# Patient Record
Sex: MALE | Race: WHITE | Employment: FULL TIME | URBAN - METROPOLITAN AREA
[De-identification: names, ages, dates, MRNs, and addresses within clinical notes are randomized per-mention and may not be internally consistent; named-entity substitution may affect disease eponyms.]

---

## 2017-06-27 ENCOUNTER — HOSPITAL ENCOUNTER (OUTPATIENT)
Dept: PHYSICAL THERAPY | Age: 47
Discharge: HOME | End: 2017-06-27
Payer: COMMERCIAL

## 2017-06-27 PROCEDURE — 97110 THERAPEUTIC EXERCISES: CPT | Performed by: PHYSICAL THERAPIST

## 2017-06-27 PROCEDURE — 97162 PT EVAL MOD COMPLEX 30 MIN: CPT | Performed by: PHYSICAL THERAPIST

## 2017-06-27 NOTE — PROGRESS NOTES
[address] [place] [place] [address], [address]  Phone: [phone]  Fax: [phone]    Plan of Care/ Statement of Necessity for Physical Therapy Services 2-15    Patient name: Orlando Mcneill. : 1970  Provider#: [id number]  Referral source: Padma Ridley MD      Medical/Treatment Diagnosis: Bilateral shoulder pain [M25.511, M25.512]     Prior Hospitalization: see medical history     Comorbidities: None  Prior Level of Function: see initial eval  Medications: Verified on Patient Summary List    Start of Care: 17      Onset Date: 2017       The Plan of Care and following information is based on the information from the initial evaluation. Assessment/ key information: Patient presents with B subacromial impingement with L > R. Today he presented with significantly impaired ROM (abduction = 90 degrees), impaired RTC strength, and postural dysfunction. Evaluation Complexity History LOW Complexity : Zero comorbidities / personal factors that will impact the outcome / POC; Examination MEDIUM Complexity : 3 Standardized tests and measures addressing body structure, function, activity limitation and / or participation in recreation  ;Presentation MEDIUM Complexity : Evolving with changing characteristics  ; Clinical Decision Making MEDIUM Complexity : FOTO score of 26-74  Overall Complexity Rating: MEDIUM    Problem List: pain affecting function, decrease ROM, decrease strength, decrease ADL/ functional abilitiies, decrease activity tolerance, decrease flexibility/ joint mobility and decrease transfer abilities   Treatment Plan may include any combination of the following: Therapeutic exercise, Therapeutic activities, Neuromuscular re-education, Physical agent/modality, Manual therapy, Patient education, Self Care training and Functional mobility training  Patient / Family readiness to learn indicated by: asking questions, trying to perform skills and interest  Persons(s) to be included in education: patient (P)  Barriers to Learning/Limitations: None  Patient Goal (s): I want to be able to reach overhead without so much pain.   Patient Self Reported Health Status: excellent  Rehabilitation Potential: excellent    Short Term Goals: To be accomplished in 6 treatments:  1. Patient will be able to reach overhead through a full ROM with <2/10 pain. 2. Patient will be able to lift 10# from the floor with <2/10 pain. 3. Patient will be able to achieve FIR >T10 on the left to allow for improved ability to put on a jacket. Long Term Goals: To be accomplished in 12 treatments:  1. Patient will be able to sleep through the night for one week with no pain or limitation. 2. Patient will be able to lift 20# from the floor with no pain or limitation. 3. Patient will be able to lift 5# over his head with no pain or limitation. Frequency / Duration: Patient to be seen 2 times per week for 8 weeks. Patient/ Caregiver education and instruction: self care, activity modification and exercises    [x]  Plan of care has been reviewed with ENRIQUE Aleman, PT , DPT, OCS, Cert. DN   6/27/2017 8:59 AM    ________________________________________________________________________    I certify that the above Therapy Services are being furnished while the patient is under my care. I agree with the treatment plan and certify that this therapy is necessary.     [de-identified] Signature:____________________  Date:____________Time: _________

## 2017-06-27 NOTE — PROGRESS NOTES
PT INITIAL EVALUATION NOTE 2-15    Patient Name: Chelsie Torres. Date:2017  : 1970  [x]  Patient  Verified  Payor: BLUE "[employer] / Plan: [place] 5747 PPO / Product Type: PPO /    In time:8:30 AM  Out time:9:30 AM  Total Treatment Time (min): 60  Visit #: 1     Treatment Area: Bilateral shoulder pain [M25.511, M25.512]    SUBJECTIVE  Pain Level (0-10 scale): 4  Any medication changes, allergies to medications, adverse drug reactions, diagnosis change, or new procedure performed?: [] No    [x] Yes (see summary sheet for update)  Subjective:     Chronic B shoulder pain, L>R  PLOF: No limitations with reaching overhead for work  Mechanism of Injury: Insidious starting in January, initially the right was worse than left. He put off getting treatment due to a death in the family, but saw an orthopedist in April. He performed an injection to both shoulders, which helped significantly on the right, but not as much the left. Now he has pain only with abduction (impingement sign) and was referred to PT. No imaging has been done, but an MRI will be performed if PT is not successful. Previous Treatment/Compliance: He has been treated at this clinic before for TKR x 2 with great success. PMHx/Surgical Hx: B TKR  Work Hx:  at [employer] Situation: Lives at home with family  Pt Goals: to reduce pain and improve mobility at work  Barriers: chronicity  Motivation: very motivated  Substance use: none   FABQ Score: low  Cognition: A & O x 3        OBJECTIVE/EXAMINATION  Posture: Forward head and rounded shoulders in sitting    Palpation: TTP along the lateral acromion  Joint Mobility: NT  Scapulohumoral Control / Rhythm:   Able to eccentrically lower with good control?  Left: [x] Yes  [] No         Right: [x] Yes  [] No      Shoulder AROM, PROM:         R   L  Shoulder Flexion  170   160       Shoulder Abduction  100   90  Shoulder ER   80   75  Shoulder IR   50   45  Shoulder FIR   T8   L1             MMT:   Shoulder flexion  4/5   4/5  Shoulder ER   4/5   4/5  Shoulder IR   5/5   5/5    Neurological: Sensations: Intact    Special Tests:   Painful Arc: Positive   Steele: Positive   Neer's: Positive   Empty/Full Can Test: Positive   Drop Arm: Negative   ER Lag:Negative   Lift Off test: Negative   AC Shear: Negative         Modality rationale: decrease pain and increase tissue extensibility to improve the patients ability to lift overhead   Min Type Additional Details    [] Estim: []Att   []Unatt        []TENS instruct                  []IFC  []Premod   []NMES                     []Other:  []w/US   []w/ice   []w/heat  Position:  Location:    []  Traction: [] Cervical       []Lumbar                       [] Prone          []Supine                       []Intermittent   []Continuous Lbs:  [] before manual  [] after manual  []w/heat    []  Ultrasound: []Continuous   [] Pulsed at:                            []1MHz   []3MHz Location:  W/cm2:    []  Paraffin         Location:  []w/heat   10 []  Ice     [x]  Heat  []  Ice massage Position: sitting  Location: B shoulders    []  Laser  []  Other: Position:  Location:    []  Vasopneumatic Device Pressure:       [] lo [] med [] hi   Temperature:    [x] Skin assessment post-treatment:  [x]intact []redness- no adverse reaction    []redness  adverse reaction:     40 min Therapeutic Exercise:  [x] See flow sheet :   Rationale: increase ROM, increase strength and improve coordination to improve the patients ability to lift overhead without pain          With   [] TE   [] TA   [] neuro   [] other: Patient Education: [x] Review HEP    [] Progressed/Changed HEP based on:   [] positioning   [] body mechanics   [] transfers   [] heat/ice application    [] other:        Other Objective/Functional Measures:    Pain Level (0-10 scale) post treatment: 3    ASSESSMENT:      [x]  See Plan of Care      Brooks Romeo PT , DPT, OCS, Cert.  DN   6/27/2017  9:00 AM

## 2017-06-29 ENCOUNTER — APPOINTMENT (OUTPATIENT)
Dept: PHYSICAL THERAPY | Age: 47
End: 2017-06-29
Payer: COMMERCIAL

## 2017-07-06 ENCOUNTER — HOSPITAL ENCOUNTER (OUTPATIENT)
Dept: PHYSICAL THERAPY | Age: 47
Discharge: HOME | End: 2017-07-06
Payer: COMMERCIAL

## 2017-07-06 PROCEDURE — 97110 THERAPEUTIC EXERCISES: CPT

## 2017-07-06 PROCEDURE — 97140 MANUAL THERAPY 1/> REGIONS: CPT

## 2017-07-10 ENCOUNTER — HOSPITAL ENCOUNTER (OUTPATIENT)
Dept: PHYSICAL THERAPY | Age: 47
Discharge: HOME | End: 2017-07-10
Payer: COMMERCIAL

## 2017-07-10 PROCEDURE — 97110 THERAPEUTIC EXERCISES: CPT | Performed by: PHYSICAL MEDICINE & REHABILITATION

## 2017-07-10 NOTE — PROGRESS NOTES
PT DAILY TREATMENT NOTE 2-15    Patient Name: Luli Gonzalez. Date:7/10/2017  : 1970  [x]  Patient  Verified  Payor: BLUE CROSS / Plan: [employer] [address] 5747 PPO / Product Type: PPO /    In time:510 pm  Out time: 610 pm  Total Treatment Time (min): 60  Visit #: 3    Treatment Area: Bilateral shoulder pain [M25.511, M25.512]    SUBJECTIVE  Pain Level (0-10 scale): 2-3  Any medication changes, allergies to medications, adverse drug reactions, diagnosis change, or new procedure performed?: [x] No    [] Yes (see summary sheet for update)  Subjective functional status/changes:   [] No changes reported  Patient reports he is doing well today. Pt stated the pain comes and goes and moves from shoulder to shoulder.     OBJECTIVE    Modality rationale: decrease inflammation and decrease pain to improve the patients ability to lift, reach, carry and complete ADL's   Min Type Additional Details    [] Estim: []Att   []Unatt        []TENS instruct                  []IFC  []Premod   []NMES                     []Other:  []w/US   []w/ice   []w/heat  Position:  Location:    []  Traction: [] Cervical       []Lumbar                       [] Prone          []Supine                       []Intermittent   []Continuous Lbs:  [] before manual  [] after manual  []w/heat    []  Ultrasound: []Continuous   [] Pulsed at:                            []1MHz   []3MHz Location:  W/cm2:    []  Paraffin         Location:  []w/heat   15 []  Ice     [x]  Heat  []  Ice massage Position: seated  Location:B shoulder    []  Laser  []  Other: Position:  Location:    []  Vasopneumatic Device Pressure:       [] lo [] med [] hi   Temperature:    [x] Skin assessment post-treatment:  [x]intact []redness- no adverse reaction    []redness  adverse reaction:     45 min Therapeutic Exercise:  [x] See flow sheet :   Rationale: increase ROM and increase strength to improve the patients ability to lift, reach, carry and complete ADL's       With   [x] TE   [] TA   [] neuro   [] other: Patient Education: [x] Review HEP    [] Progressed/Changed HEP based on:   [] positioning   [] body mechanics   [] transfers   [] heat/ice application    [] other:      Other Objective/Functional Measures:   Pt with only able to lift 90 degrees AROM flexion before pain increased. Pain Level (0-10 scale) post treatment: 1    ASSESSMENT/Changes in Function:     Patient will continue to benefit from skilled PT services to modify and progress therapeutic interventions, address functional mobility deficits, address ROM deficits, address strength deficits, analyze and address soft tissue restrictions, analyze and cue movement patterns, analyze and modify body mechanics/ergonomics and assess and modify postural abnormalities to attain remaining goals. []  See Plan of Care  []  See progress note/recertification  []  See Discharge Summary         Progress towards goals / Updated goals:  Patient able to tolerate all exercises with reports of increased muscle soreness. Patient requires verbal cues to ensure proper form and mechanics with exercises.     PLAN  [x]  Upgrade activities as tolerated     [x]  Continue plan of care  [x]  Update interventions per flow sheet       []  Discharge due to:_  []  Other:_      Clementina Ramires PTA, CPT 7/10/2017  4:52 PM

## 2017-07-12 ENCOUNTER — HOSPITAL ENCOUNTER (OUTPATIENT)
Dept: PHYSICAL THERAPY | Age: 47
Discharge: HOME | End: 2017-07-12
Payer: COMMERCIAL

## 2017-07-12 PROCEDURE — 97110 THERAPEUTIC EXERCISES: CPT | Performed by: PHYSICAL THERAPIST

## 2017-07-12 NOTE — PROGRESS NOTES
PT DAILY TREATMENT NOTE 2-15    Patient Name: Walker Jimenez.   Date:2017  : 1970  [x]  Patient  Verified  Payor: BLUE CROSS / Plan: [place] 5747 PPO / Product Type: PPO /    In time:350 pm  Out time: 4:50 pm  Total Treatment Time (min): 60  Visit #: 4    Treatment Area: Bilateral shoulder pain [M25.511, M25.512]    SUBJECTIVE  Pain Level (0-10 scale): 4/10 soreness  Any medication changes, allergies to medications, adverse drug reactions, diagnosis change, or new procedure performed?: [x] No    [] Yes (see summary sheet for update)  Subjective functional status/changes:   [] No changes reported  Pt reports after trying the new exercises he had an increase in pec soreness    OBJECTIVE    Modality rationale: decrease inflammation and decrease pain to improve the patients ability to lift, reach, carry and complete ADL's   Min Type Additional Details    [] Estim: []Att   []Unatt        []TENS instruct                  []IFC  []Premod   []NMES                     []Other:  []w/US   []w/ice   []w/heat  Position:  Location:    []  Traction: [] Cervical       []Lumbar                       [] Prone          []Supine                       []Intermittent   []Continuous Lbs:  [] before manual  [] after manual  []w/heat    []  Ultrasound: []Continuous   [] Pulsed at:                            []1MHz   []3MHz Location:  W/cm2:    []  Paraffin         Location:  []w/heat   15 []  Ice     [x]  Heat  []  Ice massage Position: seated  Location:B shoulder    []  Laser  []  Other: Position:  Location:    []  Vasopneumatic Device Pressure:       [] lo [] med [] hi   Temperature:    [x] Skin assessment post-treatment:  [x]intact []redness- no adverse reaction    []redness  adverse reaction:     45 min Therapeutic Exercise:  [x] See flow sheet :   Rationale: increase ROM and increase strength to improve the patients ability to lift, reach, carry and complete ADL's       With   [x] TE   [] TA   [] neuro   [] other: Patient Education: [x] Review HEP    [] Progressed/Changed HEP based on:   [] positioning   [] body mechanics   [] transfers   [x] heat/ice application    [] other:      Other Objective/Functional Measures:   No increase in pain with today's interventions    Pain Level (0-10 scale) post treatment: 0    ASSESSMENT/Changes in Function:     Patient will continue to benefit from skilled PT services to modify and progress therapeutic interventions, address functional mobility deficits, address ROM deficits, address strength deficits, analyze and address soft tissue restrictions, analyze and cue movement patterns, analyze and modify body mechanics/ergonomics and assess and modify postural abnormalities to attain remaining goals. []  See Plan of Care  []  See progress note/recertification  []  See Discharge Summary         Progress towards goals / Updated goals:  Patient able to tolerate all exercises with reports of increased muscle soreness. Patient requires verbal cues to ensure proper form and mechanics with exercises.     PLAN  [x]  Upgrade activities as tolerated     [x]  Continue plan of care  [x]  Update interventions per flow sheet       []  Discharge due to:_  []  Other:_      Ronaldo Rondon PT, DPT 7/12/2017  3:50 PM

## 2017-07-17 ENCOUNTER — HOSPITAL ENCOUNTER (OUTPATIENT)
Dept: PHYSICAL THERAPY | Age: 47
Discharge: HOME | End: 2017-07-17
Payer: COMMERCIAL

## 2017-07-17 PROCEDURE — 97110 THERAPEUTIC EXERCISES: CPT | Performed by: PHYSICAL MEDICINE & REHABILITATION

## 2017-07-17 NOTE — PROGRESS NOTES
PT DAILY TREATMENT NOTE 2-15    Patient Name: Lenny Garcia. Date:2017  : 1970  [x]  Patient  Verified  Payor: BLUE CROSS / Plan: [place] 5747 PPO / Product Type: PPO /    In time:525 pm  Out time: 640 pm  Total Treatment Time (min): 75  Visit #: 5    Treatment Area: Bilateral shoulder pain [M25.511, M25.512]    SUBJECTIVE  Pain Level (0-10 scale): 3/10  Any medication changes, allergies to medications, adverse drug reactions, diagnosis change, or new procedure performed?: [x] No    [] Yes (see summary sheet for update)  Subjective functional status/changes:   [] No changes reported  Pt reports he is doing pretty well today.     OBJECTIVE    Modality rationale: decrease inflammation and decrease pain to improve the patients ability to lift, reach, carry and complete ADL's   Min Type Additional Details    [] Estim: []Att   []Unatt        []TENS instruct                  []IFC  []Premod   []NMES                     []Other:  []w/US   []w/ice   []w/heat  Position:  Location:    []  Traction: [] Cervical       []Lumbar                       [] Prone          []Supine                       []Intermittent   []Continuous Lbs:  [] before manual  [] after manual  []w/heat    []  Ultrasound: []Continuous   [] Pulsed at:                            []1MHz   []3MHz Location:  W/cm2:    []  Paraffin         Location:  []w/heat   15 []  Ice     [x]  Heat  []  Ice massage Position: seated  Location:B shoulder    []  Laser  []  Other: Position:  Location:    []  Vasopneumatic Device Pressure:       [] lo [] med [] hi   Temperature:    [x] Skin assessment post-treatment:  [x]intact []redness- no adverse reaction    []redness  adverse reaction:     60 min Therapeutic Exercise:  [x] See flow sheet :   Rationale: increase ROM and increase strength to improve the patients ability to lift, reach, carry and complete ADL's       With   [x] TE   [] TA   [] neuro   [] other: Patient Education: [x] Review HEP [] Progressed/Changed HEP based on:   [] positioning   [] body mechanics   [] transfers   [x] heat/ice application    [] other:      Other Objective/Functional Measures:   No increase in pain with today's interventions    Pain Level (0-10 scale) post treatment: 0    ASSESSMENT/Changes in Function:     Patient will continue to benefit from skilled PT services to modify and progress therapeutic interventions, address functional mobility deficits, address ROM deficits, address strength deficits, analyze and address soft tissue restrictions, analyze and cue movement patterns, analyze and modify body mechanics/ergonomics and assess and modify postural abnormalities to attain remaining goals. []  See Plan of Care  []  See progress note/recertification  []  See Discharge Summary         Progress towards goals / Updated goals:  Patient able to tolerate all exercises with reports of increased muscle soreness. Patient requires verbal cues to ensure proper form and mechanics with exercises.     PLAN  [x]  Upgrade activities as tolerated     [x]  Continue plan of care  [x]  Update interventions per flow sheet       []  Discharge due to:_  []  Other:_      Leilani Patel PTA, CPT 7/17/2017  3:50 PM

## 2017-07-19 ENCOUNTER — HOSPITAL ENCOUNTER (OUTPATIENT)
Dept: PHYSICAL THERAPY | Age: 47
Discharge: HOME | End: 2017-07-19
Payer: COMMERCIAL

## 2017-07-19 PROCEDURE — 97110 THERAPEUTIC EXERCISES: CPT | Performed by: PHYSICAL THERAPIST

## 2017-07-19 NOTE — PROGRESS NOTES
PT DAILY TREATMENT NOTE 2-15    Patient Name: Luli Gonzalez.   Date:2017  : 1970  [x]  Patient  Verified  Payor: BLUE CROSS / Plan: [place] [address] 5747 PPO / Product Type: PPO /    In time: 4:25 PM  Out time: 5:35 PM  Total Treatment Time (min): 70  Visit #: 6    Treatment Area: Bilateral shoulder pain [M25.511, M25.512]    SUBJECTIVE  Pain Level (0-10 scale): 2-310  Any medication changes, allergies to medications, adverse drug reactions, diagnosis change, or new procedure performed?: [x] No    [] Yes (see summary sheet for update)  Subjective functional status/changes:   [] No changes reported  Pt reports no changes since last visit  \"If I do a lot of work overhead, I have more pain\"    OBJECTIVE    Modality rationale: decrease inflammation and decrease pain to improve the patients ability to lift, reach, carry and complete ADL's   Min Type Additional Details    [] Estim: []Att   []Unatt        []TENS instruct                  []IFC  []Premod   []NMES                     []Other:  []w/US   []w/ice   []w/heat  Position:  Location:    []  Traction: [] Cervical       []Lumbar                       [] Prone          []Supine                       []Intermittent   []Continuous Lbs:  [] before manual  [] after manual  []w/heat    []  Ultrasound: []Continuous   [] Pulsed at:                            []1MHz   []3MHz Location:  W/cm2:    []  Paraffin         Location:  []w/heat   15 []  Ice     [x]  Heat  []  Ice massage Position: seated  Location:B shoulder    []  Laser  []  Other: Position:  Location:    []  Vasopneumatic Device Pressure:       [] lo [] med [] hi   Temperature:    [x] Skin assessment post-treatment:  [x]intact []redness- no adverse reaction    []redness  adverse reaction:     55 min Therapeutic Exercise:  [x] See flow sheet :   Rationale: increase ROM and increase strength to improve the patients ability to lift, reach, carry and complete ADL's       With   [x] TE   [] TA [] neuro   [] other: Patient Education: [x] Review HEP    [] Progressed/Changed HEP based on:   [x] positioning   [] body mechanics   [] transfers   [x] heat/ice application    [] other:      Other Objective/Functional Measures:   No increase in pain with today's interventions    Pain Level (0-10 scale) post treatment: 0    ASSESSMENT/Changes in Function:     Patient will continue to benefit from skilled PT services to modify and progress therapeutic interventions, address functional mobility deficits, address ROM deficits, address strength deficits, analyze and address soft tissue restrictions, analyze and cue movement patterns, analyze and modify body mechanics/ergonomics and assess and modify postural abnormalities to attain remaining goals. []  See Plan of Care  []  See progress note/recertification  []  See Discharge Summary         Progress towards goals / Updated goals:  Patient continues to require verbal cues to complete exercises with correct form and postural awareness. Patient was able to advance several exercises this visit and is progressing well towards goals.     PLAN  [x]  Upgrade activities as tolerated     [x]  Continue plan of care  [x]  Update interventions per flow sheet       []  Discharge due to:_  []  Other:_      Abilio Chauhan, PT, DPT 7/19/2017  4:30 PM

## 2017-07-24 ENCOUNTER — HOSPITAL ENCOUNTER (OUTPATIENT)
Dept: PHYSICAL THERAPY | Age: 47
Discharge: HOME | End: 2017-07-24
Payer: COMMERCIAL

## 2017-07-24 PROCEDURE — 97016 VASOPNEUMATIC DEVICE THERAPY: CPT | Performed by: PHYSICAL THERAPIST

## 2017-07-24 PROCEDURE — 97110 THERAPEUTIC EXERCISES: CPT | Performed by: PHYSICAL THERAPIST

## 2017-07-24 NOTE — PROGRESS NOTES
PT DAILY TREATMENT NOTE 2-15    Patient Name: Michelle Ledesma.   Date:2017  : 1970  [x]  Patient  Verified  Payor: BLUE CROSS / Plan: [employer] [address] 5747 PPO / Product Type: PPO /    In time:5:10 pm  Out time: 6:15 pm  Total Treatment Time (min): 72  Visit #: 7    Treatment Area: Bilateral shoulder pain [M25.511, M25.512]    SUBJECTIVE  Pain Level (0-10 scale): 7  Any medication changes, allergies to medications, adverse drug reactions, diagnosis change, or new procedure performed?: [x] No    [] Yes (see summary sheet for update)  Subjective functional status/changes:   [] No changes reported  Patient reports he has been feeling terrible since Friday morning  He saw his MD who has scheduled him an MRI for this coming Friday    OBJECTIVE    Modality rationale: decrease inflammation and decrease pain to improve the patients ability to lift, reach, carry and complete ADL's   Min Type Additional Details    [] Estim: []Att   []Unatt        []TENS instruct                  []IFC  []Premod   []NMES                     []Other:  []w/US   []w/ice   []w/heat  Position:  Location:    []  Traction: [] Cervical       []Lumbar                       [] Prone          []Supine                       []Intermittent   []Continuous Lbs:  [] before manual  [] after manual  []w/heat    []  Ultrasound: []Continuous   [] Pulsed at:                            []1MHz   []3MHz Location:  W/cm2:    []  Paraffin         Location:  []w/heat   10 []  Ice     [x]  Heat  []  Ice massage Position: seated  Location:B shoulder    []  Laser  []  Other: Position:  Location:   10 [x]  Vasopneumatic Device Pressure:       [] lo [x] med [] hi   Temperature: 34   [x] Skin assessment post-treatment:  [x]intact []redness- no adverse reaction    []redness  adverse reaction:     45 min Therapeutic Exercise:  [x] See flow sheet :   Rationale: increase ROM and increase strength to improve the patients ability to lift, reach, carry and complete ADL's    With   [x] TE   [] TA   [] neuro   [] other: Patient Education: [x] Review HEP    [] Progressed/Changed HEP based on:   [] positioning   [] body mechanics   [] transfers   [x] heat/ice application    [] other:      Other Objective/Functional Measures:   Pain with resisted horizontal abduction and doorway stretch    Pain Level (0-10 scale) post treatment: 6    ASSESSMENT/Changes in Function:     Patient will continue to benefit from skilled PT services to modify and progress therapeutic interventions, address functional mobility deficits, address ROM deficits, address strength deficits, analyze and address soft tissue restrictions, analyze and cue movement patterns, analyze and modify body mechanics/ergonomics and assess and modify postural abnormalities to attain remaining goals.      []  See Plan of Care  []  See progress note/recertification  []  See Discharge Summary         Progress towards goals / Updated goals:  Patient limited by high levels of pain    PLAN  [x]  Upgrade activities as tolerated     [x]  Continue plan of care  [x]  Update interventions per flow sheet       []  Discharge due to:_  []  Other:_      Sotero Dexter PT, DPT 7/24/2017  5:10 PM

## 2017-07-26 ENCOUNTER — APPOINTMENT (OUTPATIENT)
Dept: PHYSICAL THERAPY | Age: 47
End: 2017-07-26
Payer: COMMERCIAL

## 2017-07-28 ENCOUNTER — HOSPITAL ENCOUNTER (OUTPATIENT)
Dept: MRI IMAGING | Age: 47
Discharge: HOME | End: 2017-07-28
Attending: ORTHOPAEDIC SURGERY
Payer: COMMERCIAL

## 2017-07-28 DIAGNOSIS — M19.019 AC (ACROMIOCLAVICULAR) ARTHRITIS: ICD-10-CM

## 2017-07-28 DIAGNOSIS — M75.41 IMPINGEMENT SYNDROME OF RIGHT SHOULDER: ICD-10-CM

## 2017-07-28 DIAGNOSIS — M77.8 TENDINITIS OF RIGHT SHOULDER: ICD-10-CM

## 2017-07-28 PROCEDURE — 73221 MRI JOINT UPR EXTREM W/O DYE: CPT

## 2017-08-28 ENCOUNTER — HOSPITAL ENCOUNTER (OUTPATIENT)
Dept: MRI IMAGING | Age: 47
Discharge: HOME | End: 2017-08-28
Attending: ORTHOPAEDIC SURGERY
Payer: COMMERCIAL

## 2017-08-28 DIAGNOSIS — M25.512 PAIN IN SHOULDER REGION, LEFT: ICD-10-CM

## 2017-08-28 PROCEDURE — 73221 MRI JOINT UPR EXTREM W/O DYE: CPT

## 2017-09-14 NOTE — PROGRESS NOTES
[address] [facility], [address] [place] [address] Drive  Phone: [phone]  Fax: [phone]    Discharge Summary  2-15    Patient name: Nicolasa Ormond. : 1970  Provider#: [id number]  Referral source: Lissett Hills MD      Medical/Treatment Diagnosis: Bilateral shoulder pain [M25.511, M25.512]     Prior Hospitalization: see medical history     Comorbidities: See Plan of Care  Prior Level of Function:See Plan of Care  Medications: Verified on Patient Summary List    Start of Care: 17      Onset Date:2017   Visits from Start of Care: 7     Missed Visits: 0  Reporting Period : 17 to 17      ASSESSMENT/SUMMARY OF CARE: Mr. Niels Bates did not show good progress with PT, although he did tolerate each session well and demonstrated strong motivation to get better. After 4 weeks of PT he returned to MD for further testing due to a lack of significant progress towards goals. Short Term Goals: To be accomplished in 6 treatments:  1. Patient will be able to reach overhead through a full ROM with <2/10 pain. Not met. 2. Patient will be able to lift 10# from the floor with <2/10 pain. Not met. 3. Patient will be able to achieve FIR >T10 on the left to allow for improved ability to put on a jacket. Not met. Long Term Goals: To be accomplished in 12 treatments:  1. Patient will be able to sleep through the night for one week with no pain or limitation. Not met. 2. Patient will be able to lift 20# from the floor with no pain or limitation. Not met. 3. Patient will be able to lift 5# over his head with no pain or limitation. Not met. RECOMMENDATIONS:  [x]Discontinue therapy: []Patient has reached or is progressing toward set goals      []Patient is non-compliant or has abdicated      [x]Due to lack of appreciable progress towards set goals      [x]Other: Patient returned to MD for further testing    Olamide Handing, PT , DPT, OCS, Cert.  DN 9/14/2017 4:03 PM

## 2018-02-12 ENCOUNTER — HOSPITAL ENCOUNTER (OUTPATIENT)
Dept: PHYSICAL THERAPY | Age: 48
Discharge: HOME | End: 2018-02-12
Payer: COMMERCIAL

## 2018-02-12 PROCEDURE — 97162 PT EVAL MOD COMPLEX 30 MIN: CPT | Performed by: PHYSICAL THERAPIST

## 2018-02-12 PROCEDURE — 97016 VASOPNEUMATIC DEVICE THERAPY: CPT | Performed by: PHYSICAL THERAPIST

## 2018-02-12 PROCEDURE — 97110 THERAPEUTIC EXERCISES: CPT | Performed by: PHYSICAL THERAPIST

## 2018-02-12 NOTE — PROGRESS NOTES
[address] [facility], [address] [place] [address] Drive  Phone: [phone]  Fax: [phone]    Plan of Care/ Statement of Necessity for Physical Therapy Services 2-15    Patient name: Ching Teixeira. : 1970  Provider#: [id number]  Referral source: Félix Pritchard MD      Medical/Treatment Diagnosis: Left shoulder pain [M25.512]     Prior Hospitalization: see medical history     Comorbidities: Chronic right shoulder pain  Prior Level of Function: see initial eval  Medications: Verified on Patient Summary List    Start of Care: 18      Onset Date: 18       The Plan of Care and following information is based on the information from the initial evaluation. Assessment/ key information: Patient is three weeks post-op L rotator cuff repair with biceps tenodesis and is progressing well along rehab protocol. PROM:   Flexion: 80 degrees   Abduction: 70   ER: 30    Evaluation Complexity History MEDIUM  Complexity : 1-2 comorbidities / personal factors will impact the outcome/ POC ; Examination MEDIUM Complexity : 3 Standardized tests and measures addressing body structure, function, activity limitation and / or participation in recreation  ;Presentation MEDIUM Complexity : Evolving with changing characteristics  ; Clinical Decision Making MEDIUM Complexity : FOTO score of 26-74  Overall Complexity Rating: MEDIUM    Problem List: pain affecting function, decrease ROM, decrease strength, edema affecting function, decrease ADL/ functional abilitiies, decrease activity tolerance, decrease flexibility/ joint mobility and decrease transfer abilities   Treatment Plan may include any combination of the following: Therapeutic exercise, Therapeutic activities, Neuromuscular re-education, Physical agent/modality, Manual therapy, Patient education, Self Care training, Functional mobility training, Home safety training and Stair training  Patient / Family readiness to learn indicated by: asking questions, trying to perform skills and interest  Persons(s) to be included in education: patient (P)  Barriers to Learning/Limitations: None  Patient Goal (s): I want to be able to get back to work ASAP.   Patient Self Reported Health Status: excellent  Rehabilitation Potential: excellent    Short Term Goals: To be accomplished in 8 treatments:  1. Patient will be able to demonstrate full PROM flexion to allow for improved ability to reach overhead. 2. Patient will be able to demonstrate >45 degrees of ER PROM to allow for improved ability to put on a jacket. 3. Patient will be able to sleep through the night without being woken up by shoulder pain. Long Term Goals: To be accomplished in 16 treatments:  1. Patient will be able to demonstrate >90 degrees of AROM flexion with <2/10 shoulder pain. 2. Patient will be able to put on a jacket with <2/10 shoulder pain. 3. Patient will be able to carry 20# at his left side for 50 ft. With no pain or limitation. Frequency / Duration: Patient to be seen 2 times per week for 8 weeks. Patient/ Caregiver education and instruction: self care, activity modification and exercises    [x]  Plan of care has been reviewed with ENRIQUE Ayala, PT , DPT, OCS, Cert. DN   2/12/2018 3:07 PM    ________________________________________________________________________    I certify that the above Therapy Services are being furnished while the patient is under my care. I agree with the treatment plan and certify that this therapy is necessary.     [de-identified] Signature:____________________  Date:____________Time: _________

## 2018-02-12 NOTE — PROGRESS NOTES
PT INITIAL EVALUATION NOTE 2-15    Patient Name: Ching Teixeira. Date:2018  : 1970  [x]  Patient  Verified  Payor: BLUE CROSS / Plan: [place] [id number] PPO / Product Type: PPO /    In time:12:30 PM Out time:1:30 PM  Total Treatment Time (min): 60  Visit #: 1     Treatment Area: Left shoulder pain [M25.512]    SUBJECTIVE  Pain Level (0-10 scale): 4  Any medication changes, allergies to medications, adverse drug reactions, diagnosis change, or new procedure performed?: [] No    [x] Yes (see summary sheet for update)  Subjective:     L RTC repair with biceps tenodesis on 18  PLOF: Limited with overhead reaching, work tasks, sleeping due to shoulder pain  Mechanism of Injury: Patient had his shoulder surgery on 18 and has had no complications since then.   Previous Treatment/Compliance: He was seen previously at this clinic in 2017 to try and resolve the shoulder pain, but was unable to and opted for surgery  PMHx/Surgical Hx: B TKR, R RTC tear  Work Hx: [employer] , out of work until April  Living Situation: lives at home with family  Pt Goals: to return to work without pain  Barriers: Chronicity, PMH  Motivation: very motivated  Substance use: none   FABQ Score: elevated  Cognition: A & O x 3        OBJECTIVE/EXAMINATION   Joint Mobility:NT      Shoulder PROM:         R   L  Shoulder Flexion     80        Shoulder Abduction     70  Shoulder Extension     NT  Shoulder ER      30  Shoulder IR      To abdomen            MMT:NT         Modality rationale: decrease pain and increase tissue extensibility to improve the patients ability to reach overhead   Min Type Additional Details    [] Estim: []Att   []Unatt        []TENS instruct                  []IFC  []Premod   []NMES                     []Other:  []w/US   []w/ice   []w/heat  Position:  Location:    []  Traction: [] Cervical       []Lumbar                       [] Prone          []Supine []Intermittent   []Continuous Lbs:  [] before manual  [] after manual  []w/heat    []  Ultrasound: []Continuous   [] Pulsed at:                            []1MHz   []3MHz Location:  W/cm2:    []  Paraffin         Location:  []w/heat   10 []  Ice     [x]  Heat  []  Ice massage Position: seated  Location: left shoulder    []  Laser  []  Other: Position:  Location:   15 [x]  Vasopneumatic Device Pressure:       [x] lo [] med [] hi   Temperature:34    [x] Skin assessment post-treatment:  [x]intact []redness- no adverse reaction    []redness  adverse reaction:     25 min Therapeutic Exercise:  [x] See flow sheet :   Rationale: increase ROM, increase strength and improve coordination to improve the patients ability to reach overhead without pain          With   [] TE   [] TA   [] neuro   [] other: Patient Education: [x] Review HEP    [] Progressed/Changed HEP based on:   [] positioning   [] body mechanics   [] transfers   [] heat/ice application    [] other:        Other Objective/Functional Measures:    Pain Level (0-10 scale) post treatment: 0      ASSESSMENT:      [x]  See Plan of Care      Brent Bennett PT , DPT, OCS, Cert.  DN   2/12/2018  3:05 PM

## 2018-02-15 ENCOUNTER — HOSPITAL ENCOUNTER (OUTPATIENT)
Dept: PHYSICAL THERAPY | Age: 48
Discharge: HOME | End: 2018-02-15
Payer: COMMERCIAL

## 2018-02-15 PROCEDURE — 97110 THERAPEUTIC EXERCISES: CPT | Performed by: PHYSICAL MEDICINE & REHABILITATION

## 2018-02-15 PROCEDURE — 97016 VASOPNEUMATIC DEVICE THERAPY: CPT | Performed by: PHYSICAL MEDICINE & REHABILITATION

## 2018-02-15 NOTE — PROGRESS NOTES
PT DAILY TREATMENT NOTE - [facility] 2-15    Patient Name: Robby Gaucher. Date:2/15/2018  : 1970  [x]  Patient  Verified  Payor: BLUE CROSS / Plan: [place] [id number] PPO / Product Type: PPO /    In time:200pm  Out time:250pm  Total Treatment Time (min): 50  Total Timed Codes (min): 35  1:1 Treatment Time ( only):    Visit #: 2     Treatment Area: Left shoulder pain [M25.512]    SUBJECTIVE  Pain Level (0-10 scale): 4/10  Any medication changes, allergies to medications, adverse drug reactions, diagnosis change, or new procedure performed?: [x] No    [] Yes (see summary sheet for update)  Subjective functional status/changes:   [] No changes reported  Patient reports his exercises are difficult at home and needs to apply heat to his shoulder to help relieve the pain.      OBJECTIVE    Modality rationale: decrease pain and increase tissue extensibility to improve the patients ability to perform ADLs and lifting ability   Min Type Additional Details    [] Estim: []Att   []Unatt        []TENS instruct                  []IFC  []Premod   []NMES                     []Other:  []w/US   []w/ice   []w/heat  Position:  Location:    []  Traction: [] Cervical       []Lumbar                       [] Prone          []Supine                       []Intermittent   []Continuous Lbs:  [] before manual  [] after manual  []w/heat    []  Ultrasound: []Continuous   [] Pulsed at:                           []1MHz   []3MHz Location:  W/cm2:    [] Paraffin         Location:   []w/heat    []  Ice     []  Heat  []  Ice massage Position:  Location:    []  Laser  []  Other: Position:  Location:      []  Vasopneumatic Device Pressure:       [] lo [] med [] hi   Temperature:      [x] Skin assessment post-treatment:  [x]intact []redness- no adverse reaction    []redness  adverse reaction:     35 min Therapeutic Exercise:  [x] See flow sheet :   Rationale: increase ROM and increase strength to improve the patients ability to perform ADLs and lifting ability    Other Objective/Functional Measures: Patient experienced slight discomfort while performing AAROM & PROM     Pain Level (0-10 scale) post treatment: sore    ASSESSMENT/Changes in Function:     Patient will continue to benefit from skilled PT services to modify and progress therapeutic interventions, address functional mobility deficits, address ROM deficits, address strength deficits, analyze and address soft tissue restrictions and analyze and cue movement patterns to attain remaining goals.      []  See Plan of Care  []  See progress note/recertification  []  See Discharge Summary         Progress towards goals / Updated goals:  Patient is progressing well towards goals, will need to improve scapular stabilization in order to progress to more advanced interventions     PLAN  [x]  Upgrade activities as tolerated     [x]  Continue plan of care  [x]  Update interventions per flow sheet       []  Discharge due to:_  []  Other:_      Lola Escalona 2/15/2018  2:02 PM  2TE 1VC

## 2018-02-19 ENCOUNTER — HOSPITAL ENCOUNTER (OUTPATIENT)
Dept: PHYSICAL THERAPY | Age: 48
Discharge: HOME | End: 2018-02-19
Payer: COMMERCIAL

## 2018-02-19 PROCEDURE — 97110 THERAPEUTIC EXERCISES: CPT

## 2018-02-19 PROCEDURE — 97016 VASOPNEUMATIC DEVICE THERAPY: CPT

## 2018-02-19 NOTE — PROGRESS NOTES
PT DAILY TREATMENT NOTE - [facility] 2-15    Patient Name: Radha Tellez. Date:2018  : 1970  [x]  Patient  Verified  Payor: BLUE CROSS / Plan: [place] [id number] PPO / Product Type: PPO /    In time:2:20p  Out time:3:15p  Total Treatment Time (min): 55  Total Timed Codes (min): 40  1:1 Treatment Time ( only):    Visit #: 3     Treatment Area: Left shoulder pain [M25.512]    SUBJECTIVE  Pain Level (0-10 scale): 4/10  Any medication changes, allergies to medications, adverse drug reactions, diagnosis change, or new procedure performed?: [x] No    [] Yes (see summary sheet for update)  Subjective functional status/changes:   [] No changes reported  Patient reports he was sore after last visit, but the exercises are going well.     OBJECTIVE    Modality rationale: decrease pain and increase tissue extensibility to improve the patients ability to perform ADLs and lifting ability   Min Type Additional Details    [] Estim: []Att   []Unatt        []TENS instruct                  []IFC  []Premod   []NMES                     []Other:  []w/US   []w/ice   []w/heat  Position:  Location:    []  Traction: [] Cervical       []Lumbar                       [] Prone          []Supine                       []Intermittent   []Continuous Lbs:  [] before manual  [] after manual  []w/heat    []  Ultrasound: []Continuous   [] Pulsed at:                           []1MHz   []3MHz Location:  W/cm2:    [] Paraffin         Location:   []w/heat    []  Ice     []  Heat  []  Ice massage Position:  Location:    []  Laser  []  Other: Position:  Location:   15   [x]  Vasopneumatic Device Pressure:       [] lo [x] med [] hi   Temperature:      [x] Skin assessment post-treatment:  [x]intact []redness- no adverse reaction    []redness  adverse reaction:     40 min Therapeutic Exercise:  [x] See flow sheet :   Rationale: increase ROM and increase strength to improve the patients ability to perform ADLs and lifting ability    Other Objective/Functional Measures:      Pain Level (0-10 scale) post treatment:  \"sore\"    ASSESSMENT/Changes in Function:     Patient will continue to benefit from skilled PT services to modify and progress therapeutic interventions, address functional mobility deficits, address ROM deficits, address strength deficits, analyze and address soft tissue restrictions and analyze and cue movement patterns to attain remaining goals. []  See Plan of Care  []  See progress note/recertification  []  See Discharge Summary         Progress towards goals / Updated goals:   Patient demonstrates good tolerance for therapeutic exercises with focus on ROM. Patient will do well with continued ROM interventions within protocol.     PLAN  [x]  Upgrade activities as tolerated     [x]  Continue plan of care  [x]  Update interventions per flow sheet       []  Discharge due to:_  []  Other:_      Miriam Perkins 2/19/2018  2:02 PM

## 2018-02-22 ENCOUNTER — HOSPITAL ENCOUNTER (OUTPATIENT)
Dept: PHYSICAL THERAPY | Age: 48
Discharge: HOME | End: 2018-02-22
Payer: COMMERCIAL

## 2018-02-22 PROCEDURE — 97110 THERAPEUTIC EXERCISES: CPT | Performed by: PHYSICAL THERAPIST

## 2018-02-22 PROCEDURE — 97016 VASOPNEUMATIC DEVICE THERAPY: CPT | Performed by: PHYSICAL THERAPIST

## 2018-02-22 NOTE — PROGRESS NOTES
PT DAILY TREATMENT NOTE - [facility] 2-15    Patient Name: Idalia Meyer. Date:2018  : 1970  [x]  Patient  Verified  Payor: BLUE CROSS / Plan: aDvichaitanya NINFA [address] 5747 PPO / Product Type: PPO /    In time:2:20 PM  Out time:3:15 PM  Total Treatment Time (min): 55  Total Timed Codes (min): 40  1:1 Treatment Time ( only):    Visit #: 4     Treatment Area: Left shoulder pain [M25.512]    SUBJECTIVE  Pain Level (0-10 scale): 4/10  Any medication changes, allergies to medications, adverse drug reactions, diagnosis change, or new procedure performed?: [x] No    [] Yes (see summary sheet for update)  Subjective functional status/changes:   [] No changes reported  Patient reports that his ROM has improved significantly and the doctor has removed the abduction pillow. He is to wear the sling another two weeks and then gradually wean away from it.     OBJECTIVE    Modality rationale: decrease pain and increase tissue extensibility to improve the patients ability to perform ADLs and lifting ability   Min Type Additional Details    [] Estim: []Att   []Unatt        []TENS instruct                  []IFC  []Premod   []NMES                     []Other:  []w/US   []w/ice   []w/heat  Position:  Location:    []  Traction: [] Cervical       []Lumbar                       [] Prone          []Supine                       []Intermittent   []Continuous Lbs:  [] before manual  [] after manual  []w/heat    []  Ultrasound: []Continuous   [] Pulsed at:                           []1MHz   []3MHz Location:  W/cm2:    [] Paraffin         Location:   []w/heat    []  Ice     []  Heat  []  Ice massage Position:  Location:    []  Laser  []  Other: Position:  Location:   15   [x]  Vasopneumatic Device Pressure:       [] lo [x] med [] hi   Temperature:      [x] Skin assessment post-treatment:  [x]intact []redness- no adverse reaction    []redness  adverse reaction:     40 min Therapeutic Exercise:  [x] See flow sheet :   Rationale: increase ROM and increase strength to improve the patients ability to perform ADLs and lifting ability    Other Objective/Functional Measures:     PROM flexion: 140   Abduction: 120  Pain Level (0-10 scale) post treatment:  \"sore\"    ASSESSMENT/Changes in Function:     Patient will continue to benefit from skilled PT services to modify and progress therapeutic interventions, address functional mobility deficits, address ROM deficits, address strength deficits, analyze and address soft tissue restrictions and analyze and cue movement patterns to attain remaining goals. []  See Plan of Care  []  See progress note/recertification  []  See Discharge Summary         Progress towards goals / Updated goals:   Patient is demonstrating a significant improvement in overhead ROM and is tolerating a steady progression in ROM well.   PLAN  [x]  Upgrade activities as tolerated     [x]  Continue plan of care  [x]  Update interventions per flow sheet       []  Discharge due to:_  []  Other:_      Isis Pugh, PT 2/22/2018  2:02 PM

## 2018-02-26 ENCOUNTER — HOSPITAL ENCOUNTER (OUTPATIENT)
Dept: PHYSICAL THERAPY | Age: 48
Discharge: HOME | End: 2018-02-26
Payer: COMMERCIAL

## 2018-02-26 PROCEDURE — 97110 THERAPEUTIC EXERCISES: CPT

## 2018-02-26 PROCEDURE — 97016 VASOPNEUMATIC DEVICE THERAPY: CPT

## 2018-02-26 NOTE — PROGRESS NOTES
PT DAILY TREATMENT NOTE - [facility] 2-15    Patient Name: Mishel Mai. Date:2018  : 1970  [x]  Patient  Verified  Payor: BLUE CROSS / Plan: [place] [id number] PPO / Product Type: PPO /    In time:2:30p  Out time:3:25p  Total Treatment Time (min): 55  Total Timed Codes (min): 40  1:1 Treatment Time ( only):    Visit #: 5     Treatment Area: Left shoulder pain [M25.512]    SUBJECTIVE  Pain Level (0-10 scale): 6/10  Any medication changes, allergies to medications, adverse drug reactions, diagnosis change, or new procedure performed?: [x] No    [] Yes (see summary sheet for update)  Subjective functional status/changes:   [] No changes reported  Patient reports he was unable to get to his exercises as much as he should have due to a busy weekend.     OBJECTIVE    Modality rationale: decrease pain and increase tissue extensibility to improve the patients ability to perform ADLs and lifting ability   Min Type Additional Details    [] Estim: []Att   []Unatt        []TENS instruct                  []IFC  []Premod   []NMES                     []Other:  []w/US   []w/ice   []w/heat  Position:  Location:    []  Traction: [] Cervical       []Lumbar                       [] Prone          []Supine                       []Intermittent   []Continuous Lbs:  [] before manual  [] after manual  []w/heat    []  Ultrasound: []Continuous   [] Pulsed at:                           []1MHz   []3MHz Location:  W/cm2:    [] Paraffin         Location:   []w/heat    []  Ice     []  Heat  []  Ice massage Position:  Location:    []  Laser  []  Other: Position:  Location:   15   [x]  Vasopneumatic Device Pressure:       [] lo [x] med [] hi   Temperature:      [x] Skin assessment post-treatment:  [x]intact []redness- no adverse reaction    []redness  adverse reaction:     40 min Therapeutic Exercise:  [x] See flow sheet :   Rationale: increase ROM and increase strength to improve the patients ability to perform ADLs and lifting ability    Other Objective/Functional Measures: increased difficulty with abduction today     Pain Level (0-10 scale) post treatment:  \"sore\"    ASSESSMENT/Changes in Function:     Patient will continue to benefit from skilled PT services to modify and progress therapeutic interventions, address functional mobility deficits, address ROM deficits, address strength deficits, analyze and address soft tissue restrictions and analyze and cue movement patterns to attain remaining goals. []  See Plan of Care  []  See progress note/recertification  []  See Discharge Summary         Progress towards goals / Updated goals:   Patient continues to demonstrate good progress towards ROM and goals.     PLAN  [x]  Upgrade activities as tolerated     [x]  Continue plan of care  [x]  Update interventions per flow sheet       []  Discharge due to:_  []  Other:_      Edyta Rondon 2/26/2018  2:02 PM

## 2018-03-01 ENCOUNTER — HOSPITAL ENCOUNTER (OUTPATIENT)
Dept: PHYSICAL THERAPY | Age: 48
Discharge: HOME | End: 2018-03-01
Payer: COMMERCIAL

## 2018-03-01 PROCEDURE — 97016 VASOPNEUMATIC DEVICE THERAPY: CPT | Performed by: PHYSICAL MEDICINE & REHABILITATION

## 2018-03-01 PROCEDURE — 97110 THERAPEUTIC EXERCISES: CPT | Performed by: PHYSICAL MEDICINE & REHABILITATION

## 2018-03-01 NOTE — PROGRESS NOTES
PT DAILY TREATMENT NOTE - [facility] 2-15    Patient Name: Heather Simpson. Date:3/1/2018  : 1970  [x]  Patient  Verified  Payor: BLUE CROSS / Plan: [place] [id number] PPO / Product Type: PPO /    In time:220 pm  Out time: 320 pm  Total Treatment Time (min): 60  Total Timed Codes (min): 45  1:1 Treatment Time ( only):    Visit #: 6     Treatment Area: Left shoulder pain [M25.512]    SUBJECTIVE  Pain Level (0-10 scale): 4/10  Any medication changes, allergies to medications, adverse drug reactions, diagnosis change, or new procedure performed?: [x] No    [] Yes (see summary sheet for update)  Subjective functional status/changes:   [] No changes reported  Patient reports he is starting a feel a little better.     OBJECTIVE    Modality rationale: decrease pain and increase tissue extensibility to improve the patients ability to perform ADLs and lifting ability   Min Type Additional Details    [] Estim: []Att   []Unatt        []TENS instruct                  []IFC  []Premod   []NMES                     []Other:  []w/US   []w/ice   []w/heat  Position:  Location:    []  Traction: [] Cervical       []Lumbar                       [] Prone          []Supine                       []Intermittent   []Continuous Lbs:  [] before manual  [] after manual  []w/heat    []  Ultrasound: []Continuous   [] Pulsed at:                           []1MHz   []3MHz Location:  W/cm2:    [] Paraffin         Location:   []w/heat    []  Ice     []  Heat  []  Ice massage Position:  Location:    []  Laser  []  Other: Position:  Location:   15   [x]  Vasopneumatic Device Pressure:       [x] lo [] med [] hi   Temperature: 34     [x] Skin assessment post-treatment:  [x]intact []redness- no adverse reaction    []redness  adverse reaction:     45 min Therapeutic Exercise:  [x] See flow sheet :   Rationale: increase ROM and increase strength to improve the patients ability to perform ADLs and lifting ability    Other Objective/Functional Measures:   Good PROM noted today. Pain Level (0-10 scale) post treatment:  \"sore\"    ASSESSMENT/Changes in Function:     Patient will continue to benefit from skilled PT services to modify and progress therapeutic interventions, address functional mobility deficits, address ROM deficits, address strength deficits, analyze and address soft tissue restrictions and analyze and cue movement patterns to attain remaining goals. []  See Plan of Care  []  See progress note/recertification  []  See Discharge Summary         Progress towards goals / Updated goals:   Patient continues to demonstrate good progress towards ROM and goals.     PLAN  [x]  Upgrade activities as tolerated     [x]  Continue plan of care  [x]  Update interventions per flow sheet       []  Discharge due to:_  []  Other:_      Cheikh Luna PTA, CPT 3/1/2018  2:02 PM

## 2018-03-05 ENCOUNTER — HOSPITAL ENCOUNTER (OUTPATIENT)
Dept: PHYSICAL THERAPY | Age: 48
Discharge: HOME | End: 2018-03-05
Payer: COMMERCIAL

## 2018-03-05 PROCEDURE — 97016 VASOPNEUMATIC DEVICE THERAPY: CPT | Performed by: PHYSICAL MEDICINE & REHABILITATION

## 2018-03-05 PROCEDURE — 97110 THERAPEUTIC EXERCISES: CPT | Performed by: PHYSICAL MEDICINE & REHABILITATION

## 2018-03-06 NOTE — PROGRESS NOTES
PT DAILY TREATMENT NOTE - [facility] 2-15    Patient Name: Arden Murray. Date:3/5/2018  : 1970  [x]  Patient  Verified  Payor: BLUE CROSS / Plan: [place] [id number] PPO / Product Type: PPO /    In time:225 pm  Out time: 325 pm  Total Treatment Time (min): 60  Total Timed Codes (min): 45  1:1 Treatment Time ( only):    Visit #: 7     Treatment Area: Left shoulder pain [M25.512]    SUBJECTIVE  Pain Level (0-10 scale): 310  Any medication changes, allergies to medications, adverse drug reactions, diagnosis change, or new procedure performed?: [x] No    [] Yes (see summary sheet for update)  Subjective functional status/changes:   [] No changes reported  Patient reports he was a little sore after last visit but overall is feeling good.     OBJECTIVE    Modality rationale: decrease pain and increase tissue extensibility to improve the patients ability to perform ADLs and lifting ability   Min Type Additional Details    [] Estim: []Att   []Unatt        []TENS instruct                  []IFC  []Premod   []NMES                     []Other:  []w/US   []w/ice   []w/heat  Position:  Location:    []  Traction: [] Cervical       []Lumbar                       [] Prone          []Supine                       []Intermittent   []Continuous Lbs:  [] before manual  [] after manual  []w/heat    []  Ultrasound: []Continuous   [] Pulsed at:                           []1MHz   []3MHz Location:  W/cm2:    [] Paraffin         Location:   []w/heat    []  Ice     []  Heat  []  Ice massage Position:  Location:    []  Laser  []  Other: Position:  Location:   15   [x]  Vasopneumatic Device Pressure:       [x] lo [] med [] hi   Temperature: 34     [x] Skin assessment post-treatment:  [x]intact []redness- no adverse reaction    []redness  adverse reaction:     45 min Therapeutic Exercise:  [x] See flow sheet :   Rationale: increase ROM and increase strength to improve the patients ability to perform ADLs and lifting ability    Other Objective/Functional Measures:   Good PROM noted today. Pain Level (0-10 scale) post treatment:  \"sore\"    ASSESSMENT/Changes in Function:     Patient will continue to benefit from skilled PT services to modify and progress therapeutic interventions, address functional mobility deficits, address ROM deficits, address strength deficits, analyze and address soft tissue restrictions and analyze and cue movement patterns to attain remaining goals. []  See Plan of Care  []  See progress note/recertification  []  See Discharge Summary         Progress towards goals / Updated goals:   Patient continues to demonstrate good progress towards ROM and goals.     PLAN  [x]  Upgrade activities as tolerated     [x]  Continue plan of care  [x]  Update interventions per flow sheet       []  Discharge due to:_  []  Other:_      Angelyn Gowers, PTA, CPT 3/5/2018  2:02 PM

## 2018-03-08 ENCOUNTER — HOSPITAL ENCOUNTER (OUTPATIENT)
Dept: PHYSICAL THERAPY | Age: 48
Discharge: HOME | End: 2018-03-08
Payer: COMMERCIAL

## 2018-03-08 PROCEDURE — 97110 THERAPEUTIC EXERCISES: CPT | Performed by: PHYSICAL THERAPIST

## 2018-03-08 PROCEDURE — 97014 ELECTRIC STIMULATION THERAPY: CPT | Performed by: PHYSICAL THERAPIST

## 2018-03-08 NOTE — PROGRESS NOTES
PT DAILY TREATMENT NOTE - [facility] 2-15    Patient Name: Heather Simpson. Date:3/8/2018  : 1970  [x]  Patient  Verified  Payor: BLUE CROSS / Plan: [place] [id number] PPO / Product Type: PPO /    In time:2:30 PM  Out time: 3:30 PM  Total Treatment Time (min): 60  Total Timed Codes (min): 45  1:1 Treatment Time ( only):    Visit #: 8     Treatment Area: Left shoulder pain [M25.512]    SUBJECTIVE  Pain Level (0-10 scale): 2/10  Any medication changes, allergies to medications, adverse drug reactions, diagnosis change, or new procedure performed?: [x] No    [] Yes (see summary sheet for update)  Subjective functional status/changes:   [] No changes reported  Patient reports he was a little sore after last visit but overall is feeling good.     OBJECTIVE    Modality rationale: decrease pain and increase tissue extensibility to improve the patients ability to perform ADLs and lifting ability   Min Type Additional Details    [] Estim: []Att   []Unatt        []TENS instruct                  []IFC  []Premod   []NMES                     []Other:  []w/US   []w/ice   []w/heat  Position:  Location:    []  Traction: [] Cervical       []Lumbar                       [] Prone          []Supine                       []Intermittent   []Continuous Lbs:  [] before manual  [] after manual  []w/heat    []  Ultrasound: []Continuous   [] Pulsed at:                           []1MHz   []3MHz Location:  W/cm2:    [] Paraffin         Location:   []w/heat    []  Ice     []  Heat  []  Ice massage Position:  Location:    []  Laser  []  Other: Position:  Location:   15   [x]  Vasopneumatic Device Pressure:       [x] lo [] med [] hi   Temperature: 34     [x] Skin assessment post-treatment:  [x]intact []redness- no adverse reaction    []redness  adverse reaction:     45 min Therapeutic Exercise:  [x] See flow sheet :   Rationale: increase ROM and increase strength to improve the patients ability to perform ADLs and lifting ability    Other Objective/Functional Measures:   Patient reported impingement-type symptoms with wall walks before     Pain Level (0-10 scale) post treatment:  \"sore\"    ASSESSMENT/Changes in Function:     Patient will continue to benefit from skilled PT services to modify and progress therapeutic interventions, address functional mobility deficits, address ROM deficits, address strength deficits, analyze and address soft tissue restrictions and analyze and cue movement patterns to attain remaining goals. []  See Plan of Care  []  See progress note/recertification  []  See Discharge Summary         Progress towards goals / Updated goals:   Patient is progressing very well through first phase of rehab protocol and tolerated today's introduction of AAROM flexion exercises well. PLAN  [x]  Upgrade activities as tolerated     [x]  Continue plan of care  [x]  Update interventions per flow sheet       []  Discharge due to:_  []  Other:_      Mihai Tran, PT  , DPT, OCS, Cert.  DN  3/8/2018  2:02 PM

## 2018-03-12 ENCOUNTER — HOSPITAL ENCOUNTER (OUTPATIENT)
Dept: PHYSICAL THERAPY | Age: 48
Discharge: HOME | End: 2018-03-12
Payer: COMMERCIAL

## 2018-03-12 PROCEDURE — 97016 VASOPNEUMATIC DEVICE THERAPY: CPT

## 2018-03-12 PROCEDURE — 97110 THERAPEUTIC EXERCISES: CPT

## 2018-03-12 NOTE — PROGRESS NOTES
PT DAILY TREATMENT NOTE - [facility] 2-15    Patient Name: Padma Price. Date:3/12/2018  : 1970  [x]  Patient  Verified  Payor: BLUE CROSS / Plan: [place] [id number] PPO / Product Type: PPO /    In time:2:20 PM  Out time: 3:30 PM  Total Treatment Time (min): 70  Total Timed Codes (min): 45  1:1 Treatment Time ( only):    Visit #: 8     Treatment Area: Left shoulder pain [M25.512]    SUBJECTIVE  Pain Level (0-10 scale): 3-4/10-soreness  Any medication changes, allergies to medications, adverse drug reactions, diagnosis change, or new procedure performed?: [x] No    [] Yes (see summary sheet for update)  Subjective functional status/changes:   [] No changes reported  Patient reports he is more sore today than he has been.     OBJECTIVE    Modality rationale: decrease pain and increase tissue extensibility to improve the patients ability to perform ADLs and lifting ability   Min Type Additional Details    [] Estim: []Att   []Unatt        []TENS instruct                  []IFC  []Premod   []NMES                     []Other:  []w/US   []w/ice   []w/heat  Position:  Location:    []  Traction: [] Cervical       []Lumbar                       [] Prone          []Supine                       []Intermittent   []Continuous Lbs:  [] before manual  [] after manual  []w/heat    []  Ultrasound: []Continuous   [] Pulsed at:                           []1MHz   []3MHz Location:  W/cm2:    [] Paraffin         Location:   []w/heat   10-pre []  Ice     [x]  Heat  []  Ice massage Position:seated  Location:L shoulder    []  Laser  []  Other: Position:  Location:   15   [x]  Vasopneumatic Device Pressure:       [x] lo [] med [] hi   Temperature: 34     [x] Skin assessment post-treatment:  [x]intact []redness- no adverse reaction    []redness  adverse reaction:     45 min Therapeutic Exercise:  [x] See flow sheet :   Rationale: increase ROM and increase strength to improve the patients ability to perform ADLs and lifting ability    Other Objective/Functional Measures:   Patient continues to demonstrates increased difficulty with PROM Abduction    Pain Level (0-10 scale) post treatment:  \"sore\"    ASSESSMENT/Changes in Function:     Patient will continue to benefit from skilled PT services to modify and progress therapeutic interventions, address functional mobility deficits, address ROM deficits, address strength deficits, analyze and address soft tissue restrictions and analyze and cue movement patterns to attain remaining goals. []  See Plan of Care  []  See progress note/recertification  []  See Discharge Summary         Progress towards goals / Updated goals: Patient able to tolerate therapeutic interventions despite increased reports of soreness. Patient making good progress through phase one of rehab protocol.      PLAN  [x]  Upgrade activities as tolerated     [x]  Continue plan of care  [x]  Update interventions per flow sheet       []  Discharge due to:_  []  Other:_      Lavon Salvage  PTA  3/12/2018  2:32 PM

## 2018-03-15 ENCOUNTER — HOSPITAL ENCOUNTER (OUTPATIENT)
Dept: PHYSICAL THERAPY | Age: 48
Discharge: HOME | End: 2018-03-15
Payer: COMMERCIAL

## 2018-03-15 PROCEDURE — 97016 VASOPNEUMATIC DEVICE THERAPY: CPT | Performed by: PHYSICAL THERAPIST

## 2018-03-15 PROCEDURE — 97110 THERAPEUTIC EXERCISES: CPT | Performed by: PHYSICAL THERAPIST

## 2018-03-15 NOTE — PROGRESS NOTES
PT DAILY TREATMENT NOTE - [facility] 2-15    Patient Name: Maine Mai. Date:3/15/2018  : 1970  [x]  Patient  Verified  Payor: BLUE CROSS / Plan: [place] [id number] PPO / Product Type: PPO /    In time:2:30 PM  Out time: 3:25 PM  Total Treatment Time (min): 55  Total Timed Codes (min): 40  1:1 Treatment Time ( only):    Visit #: 10    Treatment Area: Left shoulder pain [M25.512]    SUBJECTIVE  Pain Level (0-10 scale): 3/10  Any medication changes, allergies to medications, adverse drug reactions, diagnosis change, or new procedure performed?: [x] No    [] Yes (see summary sheet for update)  Subjective functional status/changes:   [] No changes reported  Patient reports that he had a spasm at the left shoulder last night while sitting in a recliner. It relieved after a while, however his shoulder is sore today. He has a f/u with Dr. Griselda Wood next week.      OBJECTIVE    Modality rationale: decrease pain and increase tissue extensibility to improve the patients ability to perform ADLs and lifting ability   Min Type Additional Details    [] Estim: []Att   []Unatt        []TENS instruct                  []IFC  []Premod   []NMES                     []Other:  []w/US   []w/ice   []w/heat  Position:  Location:    []  Traction: [] Cervical       []Lumbar                       [] Prone          []Supine                       []Intermittent   []Continuous Lbs:  [] before manual  [] after manual  []w/heat    []  Ultrasound: []Continuous   [] Pulsed at:                           []1MHz   []3MHz Location:  W/cm2:    [] Paraffin         Location:   []w/heat    []  Ice     []  Heat  []  Ice massage Position:seated  Location:L shoulder    []  Laser  []  Other: Position:  Location:   15   [x]  Vasopneumatic Device Pressure:       [x] lo [] med [] hi   Temperature: 34     [x] Skin assessment post-treatment:  [x]intact []redness- no adverse reaction    []redness  adverse reaction:     40 min Therapeutic Exercise: [x] See flow sheet :   Rationale: increase ROM and increase strength to improve the patients ability to perform ADLs and lifting ability    Other Objective/Functional Measures:     Pain Level (0-10 scale) post treatment:  \"sore\"    ASSESSMENT/Changes in Function:     Patient will continue to benefit from skilled PT services to modify and progress therapeutic interventions, address functional mobility deficits, address ROM deficits, address strength deficits, analyze and address soft tissue restrictions and analyze and cue movement patterns to attain remaining goals. []  See Plan of Care  []  See progress note/recertification  []  See Discharge Summary         Progress towards goals / Updated goals: Patient continues to tolerate therapy well, however increase in spasms could be due to muscle guarding. Today we reviewed pendulums and I advocated that he continue to perform them regularly to try and to reduce the level of spasms. PLAN  [x]  Upgrade activities as tolerated     [x]  Continue plan of care  [x]  Update interventions per flow sheet       []  Discharge due to:_  []  Other:_      Leo Govea, PT , DPT, OCS, Cert.  DN   3/15/2018  2:32 PM

## 2018-03-19 ENCOUNTER — HOSPITAL ENCOUNTER (OUTPATIENT)
Dept: PHYSICAL THERAPY | Age: 48
Discharge: HOME | End: 2018-03-19
Payer: COMMERCIAL

## 2018-03-19 PROCEDURE — 97016 VASOPNEUMATIC DEVICE THERAPY: CPT | Performed by: PHYSICAL THERAPIST

## 2018-03-19 PROCEDURE — 97110 THERAPEUTIC EXERCISES: CPT | Performed by: PHYSICAL THERAPIST

## 2018-03-19 NOTE — PROGRESS NOTES
[facility]  [place]  [facility] [place]  Phone: [phone]      Fax:  [phone]    Progress Note    Name: Ching Teixeira. : 1970   MD: Félix Pritchard MD       Treatment Diagnosis: Left shoulder pain [M25.512]  Start of Care: 18    Visits from Start of Care: 11  Missed Visits: 0    Summary of Care: Mr. Divya Sánchez has done well with the 1st phase of the rehab protocol with a gradual progression towards AAROM and a reduction in pain. He continues to have <3/10 pain with end-range PROM, but has no pain at rest. He should continue to progress well through 2nd phase of rehab protocol with a transition to AROM. PROM:    Flexion: 160   Abduction: 130   ER: 45      Assessment / Recommendations:     Short Term Goals: To be accomplished in 8 treatments:  1. Patient will be able to demonstrate full PROM flexion to allow for improved ability to reach overhead. Progressing towards goal.  2. Patient will be able to demonstrate >45 degrees of ER PROM to allow for improved ability to put on a jacket. Met.  3. Patient will be able to sleep through the night without being woken up by shoulder pain. Progressing towards goal.    Patient will continue to benefit from an additional 4 weeks for PT to achieve all long term goals and advance through 2nd phase of protocol. Mayda Goel, PT , DPT, OCS, Cert. DN   3/19/2018 2:54 PM    ________________________________________________________________________  NOTE TO PHYSICIAN:  Please complete the following and fax to: [facility]: [phone]  . Retain this original for your records. If you are unable to process this request in 24 hours, please contact our office.        ____ I have read the above report and request that my patient continue therapy with the following changes/special instructions:  ____ I have read the above report and request that my patient be discharged from therapy    Physician's Signature:_________________ Date:___________Time:__________

## 2018-03-19 NOTE — PROGRESS NOTES
PT DAILY TREATMENT NOTE - [facility] 2-15    Patient Name: Hang Sifuentes. Date:3/19/2018  : 1970  [x]  Patient  Verified  Payor: BLUE CROSS / Plan: [employer] [place] [id number] PPO / Product Type: PPO /    In time:2:30 PM  Out time: 3:25 PM  Total Treatment Time (min): 55  Total Timed Codes (min): 40  1:1 Treatment Time ( only):    Visit #: 11    Treatment Area: Left shoulder pain [M25.512]    SUBJECTIVE  Pain Level (0-10 scale): 3/10  Any medication changes, allergies to medications, adverse drug reactions, diagnosis change, or new procedure performed?: [x] No    [] Yes (see summary sheet for update)  Subjective functional status/changes:   [] No changes reported  Patient reports that he has not had any spasms at the side of his arm, but his shoulder is still very sore.     OBJECTIVE    Modality rationale: decrease pain and increase tissue extensibility to improve the patients ability to perform ADLs and lifting ability   Min Type Additional Details    [] Estim: []Att   []Unatt        []TENS instruct                  []IFC  []Premod   []NMES                     []Other:  []w/US   []w/ice   []w/heat  Position:  Location:    []  Traction: [] Cervical       []Lumbar                       [] Prone          []Supine                       []Intermittent   []Continuous Lbs:  [] before manual  [] after manual  []w/heat    []  Ultrasound: []Continuous   [] Pulsed at:                           []1MHz   []3MHz Location:  W/cm2:    [] Paraffin         Location:   []w/heat    []  Ice     []  Heat  []  Ice massage Position:seated  Location:L shoulder    []  Laser  []  Other: Position:  Location:   15   [x]  Vasopneumatic Device Pressure:       [x] lo [] med [] hi   Temperature: 34     [x] Skin assessment post-treatment:  [x]intact []redness- no adverse reaction    []redness  adverse reaction:     40 min Therapeutic Exercise:  [x] See flow sheet :   Rationale: increase ROM and increase strength to improve the patients ability to perform ADLs and lifting ability    Other Objective/Functional Measures:     Pain Level (0-10 scale) post treatment:  \"sore\"    ASSESSMENT/Changes in Function:     Patient will continue to benefit from skilled PT services to modify and progress therapeutic interventions, address functional mobility deficits, address ROM deficits, address strength deficits, analyze and address soft tissue restrictions and analyze and cue movement patterns to attain remaining goals. []  See Plan of Care  [x]  See progress note/recertification  []  See Discharge Summary    PLAN  [x]  Upgrade activities as tolerated     [x]  Continue plan of care  [x]  Update interventions per flow sheet       []  Discharge due to:_  []  Other:_      Jewell Grossman, PT , DPT, OCS, Cert.  DN   3/19/2018  2:32 PM

## 2018-03-22 ENCOUNTER — HOSPITAL ENCOUNTER (OUTPATIENT)
Dept: PHYSICAL THERAPY | Age: 48
Discharge: HOME | End: 2018-03-22
Payer: COMMERCIAL

## 2018-03-22 PROCEDURE — 97110 THERAPEUTIC EXERCISES: CPT | Performed by: PHYSICAL THERAPIST

## 2018-03-22 PROCEDURE — 97016 VASOPNEUMATIC DEVICE THERAPY: CPT | Performed by: PHYSICAL THERAPIST

## 2018-03-22 NOTE — PROGRESS NOTES
PT DAILY TREATMENT NOTE - [facility] 2-15    Patient Name: Chrissie Lipscomb. Date:3/22/2018  : 1970  [x]  Patient  Verified  Payor: BLUE CROSS / Plan: [employer] [place] [id number] PPO / Product Type: PPO /    In time:2:25 PM  Out time: 3:25 PM  Total Treatment Time (min): 55  Total Timed Codes (min): 40  1:1 Treatment Time ( only):    Visit #: 12    Treatment Area: Left shoulder pain [M25.512]    SUBJECTIVE  Pain Level (0-10 scale): 3/10  Any medication changes, allergies to medications, adverse drug reactions, diagnosis change, or new procedure performed?: [x] No    [] Yes (see summary sheet for update)  Subjective functional status/changes:   [] No changes reported  Patient reports that he saw his surgeon earlier today and he suggested that we start focusing on IR and progress through rehab protocol.     OBJECTIVE    Modality rationale: decrease pain and increase tissue extensibility to improve the patients ability to perform ADLs and lifting ability   Min Type Additional Details    [] Estim: []Att   []Unatt        []TENS instruct                  []IFC  []Premod   []NMES                     []Other:  []w/US   []w/ice   []w/heat  Position:  Location:    []  Traction: [] Cervical       []Lumbar                       [] Prone          []Supine                       []Intermittent   []Continuous Lbs:  [] before manual  [] after manual  []w/heat    []  Ultrasound: []Continuous   [] Pulsed at:                           []1MHz   []3MHz Location:  W/cm2:    [] Paraffin         Location:   []w/heat    []  Ice     []  Heat  []  Ice massage Position:seated  Location:L shoulder    []  Laser  []  Other: Position:  Location:   15   [x]  Vasopneumatic Device Pressure:       [x] lo [] med [] hi   Temperature: 34     [x] Skin assessment post-treatment:  [x]intact []redness- no adverse reaction    []redness  adverse reaction:     40 min Therapeutic Exercise:  [x] See flow sheet :   Rationale: increase ROM and increase strength to improve the patients ability to perform ADLs and lifting ability    Other Objective/Functional Measures:     Pain Level (0-10 scale) post treatment:  \"sore\"    ASSESSMENT/Changes in Function:     Patient will continue to benefit from skilled PT services to modify and progress therapeutic interventions, address functional mobility deficits, address ROM deficits, address strength deficits, analyze and address soft tissue restrictions and analyze and cue movement patterns to attain remaining goals. []  See Plan of Care  []  See progress note/recertification  []  See Discharge Summary    Progress towards goal:  Patient tolerated today's introduction of AAROM exercises very well and will continue to utilize as a bridge to AROM over the next several weeks. PLAN  [x]  Upgrade activities as tolerated     [x]  Continue plan of care  [x]  Update interventions per flow sheet       []  Discharge due to:_  []  Other:_      Jess Salmeron, PT , DPT, OCS, Cert.  DN   3/22/2018  2:32 PM

## 2018-03-26 ENCOUNTER — HOSPITAL ENCOUNTER (OUTPATIENT)
Dept: PHYSICAL THERAPY | Age: 48
Discharge: HOME | End: 2018-03-26
Payer: COMMERCIAL

## 2018-03-26 PROCEDURE — 97110 THERAPEUTIC EXERCISES: CPT | Performed by: PHYSICAL THERAPIST

## 2018-03-26 PROCEDURE — 97016 VASOPNEUMATIC DEVICE THERAPY: CPT | Performed by: PHYSICAL THERAPIST

## 2018-03-27 NOTE — PROGRESS NOTES
PT DAILY TREATMENT NOTE - [facility] 2-15    Patient Name: Roseanne Moreno.   Date:3/26/18  : 1970  [x]  Patient  Verified  Payor: BLUE CROSS / Plan: [employer] [place] [id number] PPO / Product Type: PPO /    In time:2:25 PM  Out time: 3:25 PM  Total Treatment Time (min): 55  Total Timed Codes (min): 40  1:1 Treatment Time ( only):    Visit #: 13    Treatment Area: Left shoulder pain [M25.512]    SUBJECTIVE  Pain Level (0-10 scale): 2/10  Any medication changes, allergies to medications, adverse drug reactions, diagnosis change, or new procedure performed?: [x] No    [] Yes (see summary sheet for update)  Subjective functional status/changes:   [x] No changes reported      OBJECTIVE    Modality rationale: decrease pain and increase tissue extensibility to improve the patients ability to perform ADLs and lifting ability   Min Type Additional Details    [] Estim: []Att   []Unatt        []TENS instruct                  []IFC  []Premod   []NMES                     []Other:  []w/US   []w/ice   []w/heat  Position:  Location:    []  Traction: [] Cervical       []Lumbar                       [] Prone          []Supine                       []Intermittent   []Continuous Lbs:  [] before manual  [] after manual  []w/heat    []  Ultrasound: []Continuous   [] Pulsed at:                           []1MHz   []3MHz Location:  W/cm2:    [] Paraffin         Location:   []w/heat    []  Ice     []  Heat  []  Ice massage Position:seated  Location:L shoulder    []  Laser  []  Other: Position:  Location:   15   [x]  Vasopneumatic Device Pressure:       [x] lo [] med [] hi   Temperature: 34     [x] Skin assessment post-treatment:  [x]intact []redness- no adverse reaction    []redness  adverse reaction:     40 min Therapeutic Exercise:  [x] See flow sheet :   Rationale: increase ROM and increase strength to improve the patients ability to perform ADLs and lifting ability    Other Objective/Functional Measures:     Pain Level (0-10 scale) post treatment:  \"sore\"    ASSESSMENT/Changes in Function:     Patient will continue to benefit from skilled PT services to modify and progress therapeutic interventions, address functional mobility deficits, address ROM deficits, address strength deficits, analyze and address soft tissue restrictions and analyze and cue movement patterns to attain remaining goals. []  See Plan of Care  []  See progress note/recertification  []  See Discharge Summary    Progress towards goal:  Patient continues to tolerate a steady progression of therapeutic exercises through the 2nd phase of rehab protocol and ROM is showing good improvement. PLAN  [x]  Upgrade activities as tolerated     [x]  Continue plan of care  [x]  Update interventions per flow sheet       []  Discharge due to:_  []  Other:_      Deanne Diaz, PT , DPT, OCS, Cert.  DN   3/27/2018  2:32 PM

## 2018-03-29 ENCOUNTER — HOSPITAL ENCOUNTER (OUTPATIENT)
Dept: PHYSICAL THERAPY | Age: 48
Discharge: HOME | End: 2018-03-29
Payer: COMMERCIAL

## 2018-03-29 PROCEDURE — 97016 VASOPNEUMATIC DEVICE THERAPY: CPT | Performed by: PHYSICAL THERAPIST

## 2018-03-29 PROCEDURE — 97110 THERAPEUTIC EXERCISES: CPT | Performed by: PHYSICAL THERAPIST

## 2018-03-29 NOTE — PROGRESS NOTES
PT DAILY TREATMENT NOTE - [facility] 2-15    Patient Name: Jurgen Barreto. Date:3/29/18  : 1970  [x]  Patient  Verified  Payor: BLUE CROSS / Plan: Jasonmike OCASIO Allen [id number] PPO / Product Type: PPO /    In time:2:15 PM  Out time: 3:10 PM  Total Treatment Time (min): 55  Total Timed Codes (min): 40  1:1 Treatment Time ( only):    Visit #: 14    Treatment Area: Left shoulder pain [M25.512]    SUBJECTIVE  Pain Level (0-10 scale): 4/10  Any medication changes, allergies to medications, adverse drug reactions, diagnosis change, or new procedure performed?: [x] No    [] Yes (see summary sheet for update)  Subjective functional status/changes:   [] No changes reported  Patient reports an increase in soreness at the shoulder today for no apparent reason. He has been able to perform his exercise program regularly.     OBJECTIVE    Modality rationale: decrease pain and increase tissue extensibility to improve the patients ability to perform ADLs and lifting ability   Min Type Additional Details    [] Estim: []Att   []Unatt        []TENS instruct                  []IFC  []Premod   []NMES                     []Other:  []w/US   []w/ice   []w/heat  Position:  Location:    []  Traction: [] Cervical       []Lumbar                       [] Prone          []Supine                       []Intermittent   []Continuous Lbs:  [] before manual  [] after manual  []w/heat    []  Ultrasound: []Continuous   [] Pulsed at:                           []1MHz   []3MHz Location:  W/cm2:    [] Paraffin         Location:   []w/heat    []  Ice     []  Heat  []  Ice massage Position:seated  Location:L shoulder    []  Laser  []  Other: Position:  Location:   15   [x]  Vasopneumatic Device Pressure:       [x] lo [] med [] hi   Temperature: 34     [x] Skin assessment post-treatment:  [x]intact []redness- no adverse reaction    []redness  adverse reaction:     40 min Therapeutic Exercise:  [x] See flow sheet :   Rationale: increase ROM and increase strength to improve the patients ability to perform ADLs and lifting ability    Other Objective/Functional Measures:     Pain Level (0-10 scale) post treatment:  \"sore\"    ASSESSMENT/Changes in Function:     Patient will continue to benefit from skilled PT services to modify and progress therapeutic interventions, address functional mobility deficits, address ROM deficits, address strength deficits, analyze and address soft tissue restrictions and analyze and cue movement patterns to attain remaining goals. []  See Plan of Care  []  See progress note/recertification  []  See Discharge Summary    Progress towards goal:  Patient is demonstrating improved control with AAROM exercises, however if increased pain at the shoulder continues then ther ex may be modified to allow for decreased discomfort. PLAN  [x]  Upgrade activities as tolerated     [x]  Continue plan of care  [x]  Update interventions per flow sheet       []  Discharge due to:_  []  Other:_      Michael Hill, PT , DPT, OCS, Cert.  DN   3/29/2018  2:32 PM

## 2018-04-02 ENCOUNTER — HOSPITAL ENCOUNTER (OUTPATIENT)
Dept: PHYSICAL THERAPY | Age: 48
Discharge: HOME | End: 2018-04-02
Payer: COMMERCIAL

## 2018-04-02 PROCEDURE — 97016 VASOPNEUMATIC DEVICE THERAPY: CPT | Performed by: PHYSICAL THERAPIST

## 2018-04-02 PROCEDURE — 97110 THERAPEUTIC EXERCISES: CPT | Performed by: PHYSICAL THERAPIST

## 2018-04-05 ENCOUNTER — HOSPITAL ENCOUNTER (OUTPATIENT)
Dept: PHYSICAL THERAPY | Age: 48
Discharge: HOME | End: 2018-04-05
Payer: COMMERCIAL

## 2018-04-05 PROCEDURE — 97016 VASOPNEUMATIC DEVICE THERAPY: CPT | Performed by: PHYSICAL MEDICINE & REHABILITATION

## 2018-04-05 PROCEDURE — 97110 THERAPEUTIC EXERCISES: CPT | Performed by: PHYSICAL MEDICINE & REHABILITATION

## 2018-04-05 NOTE — PROGRESS NOTES
PT DAILY TREATMENT NOTE - [facility] 2-15    Patient Name: Edwardo Bell. Date:3/26/18  : 1970  [x]  Patient  Verified  Payor: BLUE CROSS / Plan: [employer] [place] [id number] PPO / Product Type: PPO /    In time:245 PM  Out time: 355 PM  Total Treatment Time (min): 70  Total Timed Codes (min): 55  1:1 Treatment Time ( only):    Visit #: 14    Treatment Area: Left shoulder pain [M25.512]    SUBJECTIVE  Pain Level (0-10 scale): 2/10 \"sore\"  Any medication changes, allergies to medications, adverse drug reactions, diagnosis change, or new procedure performed?: [x] No    [] Yes (see summary sheet for update)  Subjective functional status/changes:   [] No changes reported  Patient reports he slept on the couch last two nights and the should has been less sore since sleeping in the bed 3 nights ago.     OBJECTIVE    Modality rationale: decrease pain and increase tissue extensibility to improve the patients ability to perform ADLs and lifting ability   Min Type Additional Details    [] Estim: []Att   []Unatt        []TENS instruct                  []IFC  []Premod   []NMES                     []Other:  []w/US   []w/ice   []w/heat  Position:  Location:    []  Traction: [] Cervical       []Lumbar                       [] Prone          []Supine                       []Intermittent   []Continuous Lbs:  [] before manual  [] after manual  []w/heat    []  Ultrasound: []Continuous   [] Pulsed at:                           []1MHz   []3MHz Location:  W/cm2:    [] Paraffin         Location:   []w/heat    []  Ice     []  Heat  []  Ice massage Position:seated  Location:L shoulder    []  Laser  []  Other: Position:  Location:   15   [x]  Vasopneumatic Device Pressure:       [x] lo [] med [] hi   Temperature: 34     [x] Skin assessment post-treatment:  [x]intact []redness- no adverse reaction    []redness  adverse reaction:     55 min Therapeutic Exercise:  [x] See flow sheet :   Rationale: increase ROM and increase strength to improve the patients ability to perform ADLs and lifting ability    Other Objective/Functional Measures:   Great ROM noted. Updated HEP     Pain Level (0-10 scale) post treatment:  1/10    ASSESSMENT/Changes in Function:     Patient will continue to benefit from skilled PT services to modify and progress therapeutic interventions, address functional mobility deficits, address ROM deficits, address strength deficits, analyze and address soft tissue restrictions and analyze and cue movement patterns to attain remaining goals. []  See Plan of Care  []  See progress note/recertification  []  See Discharge Summary    Progress towards goal:  Patient is demonstrating improved control with AAROM exercises and should progress well in next phase in 2 weeks.     PLAN  [x]  Upgrade activities as tolerated     [x]  Continue plan of care  [x]  Update interventions per flow sheet       []  Discharge due to:_  []  Other:_      Ana Luisa Chacon PTA, CPT   4/5/2018  2:32 PM

## 2018-04-09 ENCOUNTER — HOSPITAL ENCOUNTER (OUTPATIENT)
Dept: PHYSICAL THERAPY | Age: 48
Discharge: HOME | End: 2018-04-09
Payer: COMMERCIAL

## 2018-04-09 PROCEDURE — 97110 THERAPEUTIC EXERCISES: CPT | Performed by: PHYSICAL THERAPIST

## 2018-04-09 PROCEDURE — 97140 MANUAL THERAPY 1/> REGIONS: CPT | Performed by: PHYSICAL THERAPIST

## 2018-04-09 NOTE — PROGRESS NOTES
PT DAILY TREATMENT NOTE - [facility] 2-15    Patient Name: Chelsie Salinas.   Date:18  : 1970  [x]  Patient  Verified  Payor: BLUE CROSS / Plan: [employer] Vilal 5747 PPO / Product Type: PPO /    In time:2:30 PM  Out time: 3:40 PM  Total Treatment Time (min): 70  Total Timed Codes (min): 55  1:1 Treatment Time ( only):    Visit #: 16    Treatment Area: Left shoulder pain [M25.512]    SUBJECTIVE  Pain Level (0-10 scale): 0/10 \"sore\"  Any medication changes, allergies to medications, adverse drug reactions, diagnosis change, or new procedure performed?: [x] No    [] Yes (see summary sheet for update)  Subjective functional status/changes:   [x] No changes reported      OBJECTIVE    Modality rationale: decrease pain and increase tissue extensibility to improve the patients ability to perform ADLs and lifting ability   Min Type Additional Details    [] Estim: []Att   []Unatt        []TENS instruct                  []IFC  []Premod   []NMES                     []Other:  []w/US   []w/ice   []w/heat  Position:  Location:    []  Traction: [] Cervical       []Lumbar                       [] Prone          []Supine                       []Intermittent   []Continuous Lbs:  [] before manual  [] after manual  []w/heat    []  Ultrasound: []Continuous   [] Pulsed at:                           []1MHz   []3MHz Location:  W/cm2:    [] Paraffin         Location:   []w/heat    []  Ice     []  Heat  []  Ice massage Position:seated  Location:L shoulder    []  Laser  []  Other: Position:  Location:   15   [x]  Vasopneumatic Device Pressure:       [x] lo [] med [] hi   Temperature: 34     [x] Skin assessment post-treatment:  [x]intact []redness- no adverse reaction    []redness  adverse reaction:     55 min Therapeutic Exercise:  [x] See flow sheet :   Rationale: increase ROM and increase strength to improve the patients ability to perform ADLs and lifting ability    Other Objective/Functional Measures:    AROM flexion: 140 degrees     Pain Level (0-10 scale) post treatment:  1/10    ASSESSMENT/Changes in Function:     Patient will continue to benefit from skilled PT services to modify and progress therapeutic interventions, address functional mobility deficits, address ROM deficits, address strength deficits, analyze and address soft tissue restrictions and analyze and cue movement patterns to attain remaining goals. []  See Plan of Care  []  See progress note/recertification  []  See Discharge Summary    Progress towards goal:  Patient tolerated today's introduction of AROM flexion very well and will continue to progress as tolerated next visit. PLAN  [x]  Upgrade activities as tolerated     [x]  Continue plan of care  [x]  Update interventions per flow sheet       []  Discharge due to:_  []  Other:_      Rachel Araiza, PT  , DPT, OCS, Cert.  DN     4/9/2018  2:32 PM

## 2018-04-12 ENCOUNTER — HOSPITAL ENCOUNTER (OUTPATIENT)
Dept: PHYSICAL THERAPY | Age: 48
Discharge: HOME | End: 2018-04-12
Payer: COMMERCIAL

## 2018-04-12 PROCEDURE — 97016 VASOPNEUMATIC DEVICE THERAPY: CPT | Performed by: PHYSICAL THERAPIST

## 2018-04-12 PROCEDURE — 97110 THERAPEUTIC EXERCISES: CPT | Performed by: PHYSICAL THERAPIST

## 2018-04-12 NOTE — PROGRESS NOTES
PT DAILY TREATMENT NOTE - [facility] 2-15    Patient Name: Luli Gonzalez. Date:18  : 1970  [x]  Patient  Verified  Payor: BLUE CROSS / Plan: [employer] [place] [id number] PPO / Product Type: PPO /    In time:2:30 PM  Out time: 3:40 PM  Total Treatment Time (min): 70  Total Timed Codes (min): 55  1:1 Treatment Time ( only):    Visit #: 17    Treatment Area: Left shoulder pain [M25.512]    SUBJECTIVE  Pain Level (0-10 scale): 2/10 \"sore\"  Any medication changes, allergies to medications, adverse drug reactions, diagnosis change, or new procedure performed?: [x] No    [] Yes (see summary sheet for update)  Subjective functional status/changes:   [] No changes reported  Patient reports increased soreness at the top of his shoulder this week, but not significant pain.     OBJECTIVE    Modality rationale: decrease pain and increase tissue extensibility to improve the patients ability to perform ADLs and lifting ability   Min Type Additional Details    [] Estim: []Att   []Unatt        []TENS instruct                  []IFC  []Premod   []NMES                     []Other:  []w/US   []w/ice   []w/heat  Position:  Location:    []  Traction: [] Cervical       []Lumbar                       [] Prone          []Supine                       []Intermittent   []Continuous Lbs:  [] before manual  [] after manual  []w/heat    []  Ultrasound: []Continuous   [] Pulsed at:                           []1MHz   []3MHz Location:  W/cm2:    [] Paraffin         Location:   []w/heat    []  Ice     []  Heat  []  Ice massage Position:seated  Location:L shoulder    []  Laser  []  Other: Position:  Location:   15   [x]  Vasopneumatic Device Pressure:       [x] lo [] med [] hi   Temperature: 34     [x] Skin assessment post-treatment:  [x]intact []redness- no adverse reaction    []redness  adverse reaction:     55 min Therapeutic Exercise:  [x] See flow sheet :   Rationale: increase ROM and increase strength to improve the patients ability to perform ADLs and lifting ability    Other Objective/Functional Measures:      Pain Level (0-10 scale) post treatment:  1/10    ASSESSMENT/Changes in Function:     Patient will continue to benefit from skilled PT services to modify and progress therapeutic interventions, address functional mobility deficits, address ROM deficits, address strength deficits, analyze and address soft tissue restrictions and analyze and cue movement patterns to attain remaining goals. []  See Plan of Care  []  See progress note/recertification  []  See Discharge Summary    Progress towards goal:  Patient tolerated today's progression of therapeutic exercises well and is showing improved endurance with overhead reaching. Will add light resistance to scaption next visit. PLAN  [x]  Upgrade activities as tolerated     [x]  Continue plan of care  [x]  Update interventions per flow sheet       []  Discharge due to:_  []  Other:_      Isauro Gil, PT  , DPT, OCS, Cert.  DN     4/12/2018  2:32 PM

## 2018-04-16 ENCOUNTER — HOSPITAL ENCOUNTER (OUTPATIENT)
Dept: PHYSICAL THERAPY | Age: 48
Discharge: HOME | End: 2018-04-16
Payer: COMMERCIAL

## 2018-04-16 PROCEDURE — 97110 THERAPEUTIC EXERCISES: CPT | Performed by: PHYSICAL MEDICINE & REHABILITATION

## 2018-04-16 PROCEDURE — 97016 VASOPNEUMATIC DEVICE THERAPY: CPT | Performed by: PHYSICAL MEDICINE & REHABILITATION

## 2018-04-16 NOTE — PROGRESS NOTES
PT DAILY TREATMENT NOTE - [facility] 2-15    Patient Name: Liat Chacon. Date:3/26/18  : 1970  [x]  Patient  Verified  Payor: BLUE CROSS / Plan: [employer] [place] 5747 PPO / Product Type: PPO /    In time:2:30 PM  Out time: 3:40 PM  Total Treatment Time (min): 70  Total Timed Codes (min): 55  1:1 Treatment Time ( only):    Visit #: 18    Treatment Area: Left shoulder pain [M25.512]    SUBJECTIVE  Pain Level (0-10 scale): 2/10 \"sore\"  Any medication changes, allergies to medications, adverse drug reactions, diagnosis change, or new procedure performed?: [x] No    [] Yes (see summary sheet for update)  Subjective functional status/changes:   [] No changes reported  Patient reports increased soreness at the top of his shoulder this week, but not significant pain.     OBJECTIVE    Modality rationale: decrease pain and increase tissue extensibility to improve the patients ability to perform ADLs and lifting ability   Min Type Additional Details    [] Estim: []Att   []Unatt        []TENS instruct                  []IFC  []Premod   []NMES                     []Other:  []w/US   []w/ice   []w/heat  Position:  Location:    []  Traction: [] Cervical       []Lumbar                       [] Prone          []Supine                       []Intermittent   []Continuous Lbs:  [] before manual  [] after manual  []w/heat    []  Ultrasound: []Continuous   [] Pulsed at:                           []1MHz   []3MHz Location:  W/cm2:    [] Paraffin         Location:   []w/heat    []  Ice     []  Heat  []  Ice massage Position:seated  Location:L shoulder    []  Laser  []  Other: Position:  Location:   15   [x]  Vasopneumatic Device Pressure:       [x] lo [] med [] hi   Temperature: 34     [x] Skin assessment post-treatment:  [x]intact []redness- no adverse reaction    []redness  adverse reaction:     55 min Therapeutic Exercise:  [x] See flow sheet :   Rationale: increase ROM and increase strength to improve the patients ability to perform ADLs and lifting ability    Other Objective/Functional Measures:    AROM:               Flexion: 180              Abduction: 180              ER: 55    IR: T12     Pain Level (0-10 scale) post treatment:  1/10    ASSESSMENT/Changes in Function:     Patient will continue to benefit from skilled PT services to modify and progress therapeutic interventions, address functional mobility deficits, address ROM deficits, address strength deficits, analyze and address soft tissue restrictions and analyze and cue movement patterns to attain remaining goals. []  See Plan of Care  [x]  See progress note  []  See Discharge Summary    Progress towards goal:  Patient is showing great progress towards goals with great ROM noted today. Patient continues to be limited in overall strength and lifting ability but continues to show progress. Short Term Goals: To be accomplished in 8 treatments:  1. Patient will be able to demonstrate full PROM flexion to allow for improved ability to reach overhead. - MET  2. Patient will be able to demonstrate >45 degrees of ER PROM to allow for improved ability to put on a jacket. - MET  3. Patient will be able to sleep through the night without being woken up by shoulder pain. - Not met in bed  Long Term Goals: To be accomplished in 16 treatments:  1. Patient will be able to demonstrate >90 degrees of AROM flexion with <2/10 shoulder pain. - MET  2. Patient will be able to put on a jacket with <2/10 shoulder pain. - Not Met  3. Patient will be able to carry 20# at his left side for 50 ft.  With no pain or limitation. - Not Met    PLAN  [x]  Upgrade activities as tolerated     [x]  Continue plan of care  [x]  Update interventions per flow sheet       []  Discharge due to:_  []  Other:_      Candelaria Aragon PTA, CPT 4/16/2018  2:32 PM

## 2018-04-16 NOTE — PROGRESS NOTES
PT DAILY TREATMENT NOTE - [facility] 2-15    Patient Name: Shirley Gay.   Date:18  : 1970  [x]  Patient  Verified  Payor: BLUE CROSS / Plan: [employer] [place] 5747 PPO / Product Type: PPO /    In time:2:15 PM  Out time: 3:10 PM  Total Treatment Time (min): 55  Total Timed Codes (min): 40  1:1 Treatment Time ( only):    Visit #: 15    Treatment Area: Left shoulder pain [M25.512]    SUBJECTIVE  Pain Level (0-10 scale): 10  Any medication changes, allergies to medications, adverse drug reactions, diagnosis change, or new procedure performed?: [x] No    [] Yes (see summary sheet for update)  Subjective functional status/changes:   [x] No changes reported    OBJECTIVE    Modality rationale: decrease pain and increase tissue extensibility to improve the patients ability to perform ADLs and lifting ability   Min Type Additional Details    [] Estim: []Att   []Unatt        []TENS instruct                  []IFC  []Premod   []NMES                     []Other:  []w/US   []w/ice   []w/heat  Position:  Location:    []  Traction: [] Cervical       []Lumbar                       [] Prone          []Supine                       []Intermittent   []Continuous Lbs:  [] before manual  [] after manual  []w/heat    []  Ultrasound: []Continuous   [] Pulsed at:                           []1MHz   []3MHz Location:  W/cm2:    [] Paraffin         Location:   []w/heat    []  Ice     []  Heat  []  Ice massage Position:seated  Location:L shoulder    []  Laser  []  Other: Position:  Location:   15   [x]  Vasopneumatic Device Pressure:       [x] lo [] med [] hi   Temperature: 34     [x] Skin assessment post-treatment:  [x]intact []redness- no adverse reaction    []redness  adverse reaction:     40 min Therapeutic Exercise:  [x] See flow sheet :   Rationale: increase ROM and increase strength to improve the patients ability to perform ADLs and lifting ability    Other Objective/Functional Measures:     Pain Level (0-10 scale) post treatment:  \"sore\"    ASSESSMENT/Changes in Function:     Patient will continue to benefit from skilled PT services to modify and progress therapeutic interventions, address functional mobility deficits, address ROM deficits, address strength deficits, analyze and address soft tissue restrictions and analyze and cue movement patterns to attain remaining goals. []  See Plan of Care  []  See progress note/recertification  []  See Discharge Summary    Progress towards goal:  Patient continues to do excellent overall with current rehab protocol and tolerated today's progression very well with no increase in symptoms. PLAN  [x]  Upgrade activities as tolerated     [x]  Continue plan of care  [x]  Update interventions per flow sheet       []  Discharge due to:_  []  Other:_      Brooks Romeo, PT , DPT, OCS, Cert.  DN   4/16/2018  2:32 PM

## 2018-04-19 ENCOUNTER — HOSPITAL ENCOUNTER (OUTPATIENT)
Dept: PHYSICAL THERAPY | Age: 48
Discharge: HOME | End: 2018-04-19
Payer: COMMERCIAL

## 2018-04-19 PROCEDURE — 97110 THERAPEUTIC EXERCISES: CPT | Performed by: PHYSICAL THERAPIST

## 2018-04-19 PROCEDURE — 97014 ELECTRIC STIMULATION THERAPY: CPT | Performed by: PHYSICAL THERAPIST

## 2018-04-19 NOTE — PROGRESS NOTES
PT DAILY TREATMENT NOTE - [facility] 2-15    Patient Name: Nikki Juarez. Date:18  : 1970  [x]  Patient  Verified  Payor: BLUE CROSS / Plan: [employer] [place] 5747 PPO / Product Type: PPO /    In time:2:15 PM  Out time: 3:25 PM  Total Treatment Time (min): 70  Total Timed Codes (min): 55  1:1 Treatment Time ( only):    Visit #: 19    Treatment Area: Left shoulder pain [M25.512]    SUBJECTIVE  Pain Level (0-10 scale): \"sore\"  Any medication changes, allergies to medications, adverse drug reactions, diagnosis change, or new procedure performed?: [x] No    [] Yes (see summary sheet for update)  Subjective functional status/changes:   [] No changes reported  Patient reports that the surgeon wants him to continue to focus on improving strength and endurance at the shoulder.     OBJECTIVE    Modality rationale: decrease pain and increase tissue extensibility to improve the patients ability to perform ADLs and lifting ability   Min Type Additional Details    [] Estim: []Att   []Unatt        []TENS instruct                  []IFC  []Premod   []NMES                     []Other:  []w/US   []w/ice   []w/heat  Position:  Location:    []  Traction: [] Cervical       []Lumbar                       [] Prone          []Supine                       []Intermittent   []Continuous Lbs:  [] before manual  [] after manual  []w/heat    []  Ultrasound: []Continuous   [] Pulsed at:                           []1MHz   []3MHz Location:  W/cm2:    [] Paraffin         Location:   []w/heat    []  Ice     []  Heat  []  Ice massage Position:seated  Location:L shoulder    []  Laser  []  Other: Position:  Location:   15   [x]  Vasopneumatic Device Pressure:       [x] lo [] med [] hi   Temperature: 34     [x] Skin assessment post-treatment:  [x]intact []redness- no adverse reaction    []redness  adverse reaction:     55 min Therapeutic Exercise:  [x] See flow sheet :   Rationale: increase ROM and increase strength to improve the patients ability to perform ADLs and lifting ability    Other Objective/Functional Measures:        Pain Level (0-10 scale) post treatment:  0/10    ASSESSMENT/Changes in Function:     Patient will continue to benefit from skilled PT services to modify and progress therapeutic interventions, address functional mobility deficits, address ROM deficits, address strength deficits, analyze and address soft tissue restrictions and analyze and cue movement patterns to attain remaining goals. []  See Plan of Care  [x]  See progress note  []  See Discharge Summary    Progress towards goal:  Patient tolerated today's significant progression of therapeutic exercises very well and is showing excellent progress towards long term goals. PLAN  [x]  Upgrade activities as tolerated     [x]  Continue plan of care  [x]  Update interventions per flow sheet       []  Discharge due to:_  []  Other:_      Vandana Ventura PT  , DPT, OCS, Cert.  DN   4/19/2018  2:32 PM

## 2018-04-19 NOTE — PROGRESS NOTES
[facility]  [place]  [facility], [facility] [address]  Phone: [phone]      Fax:  [phone]    Progress Note    Name: Brinda Ahumada. : 1970   MD: Aime Miller MD       Treatment Diagnosis: Left shoulder pain [M25.512]  Start of Care: 18    Visits from Start of Care: 18  Missed Visits: 0    Summary of Care: Mr. Efra Bustos continues to do very well with PT with a significant improvement in functional IR ROM and ability with overhead reaching. Resisted rotator cuff and biceps strengthening exercises have been initiated with light weight. He will be gradually progressed through the next phase of the rehab protocol with a focus on dynamic RTC strengthening. AROM:               KUCYALH: 180              Abduction: 180              ER: 55                        IR: T12      Assessment / Recommendations:     Short Term Goals: To be accomplished in 8 treatments:  1. Patient will be able to demonstrate full PROM flexion to allow for improved ability to reach overhead. - MET  2. Patient will be able to demonstrate >45 degrees of ER PROM to allow for improved ability to put on a jacket. - MET  3. Patient will be able to sleep through the night without being woken up by shoulder pain. - Not met in bed  Long Term Goals: To be accomplished in 16 treatments:  1. Patient will be able to demonstrate >90 degrees of AROM flexion with <2/10 shoulder pain. - MET  2. Patient will be able to put on a jacket with <2/10 shoulder pain. - Not Met  3. Patient will be able to carry 20# at his left side for 50 ft. With no pain or limitation. - Not Met    Patient will continue to benefit from an additional 4 weeks for PT to achieve all long term goals and advance through 3rd phase of protocol. Shae Stanton, PT , DPT, OCS, Cert.  DN   2018 2:54 PM    ________________________________________________________________________  NOTE TO PHYSICIAN: Please complete the following and fax to: [facility]: [phone]  . Retain this original for your records. If you are unable to process this request in 24 hours, please contact our office.        ____ I have read the above report and request that my patient continue therapy with the following changes/special instructions:  ____ I have read the above report and request that my patient be discharged from therapy    Physician's Signature:_________________ Date:___________Time:__________

## 2018-04-23 ENCOUNTER — HOSPITAL ENCOUNTER (OUTPATIENT)
Dept: PHYSICAL THERAPY | Age: 48
Discharge: HOME | End: 2018-04-23
Payer: COMMERCIAL

## 2018-04-23 PROCEDURE — 97016 VASOPNEUMATIC DEVICE THERAPY: CPT | Performed by: PHYSICAL THERAPIST

## 2018-04-23 PROCEDURE — 97014 ELECTRIC STIMULATION THERAPY: CPT | Performed by: PHYSICAL THERAPIST

## 2018-04-23 PROCEDURE — 97110 THERAPEUTIC EXERCISES: CPT | Performed by: PHYSICAL THERAPIST

## 2018-04-23 NOTE — PROGRESS NOTES
PT DAILY TREATMENT NOTE - [facility] 2-15    Patient Name: Virgiloi Rios. Date:18  : 1970  [x]  Patient  Verified  Payor: BLUE CROSS / Plan: [place] [id number] PPO / Product Type: PPO /    In time:2:20 PM  Out time: 3:30 PM  Total Treatment Time (min): 70  Total Timed Codes (min): 55  1:1 Treatment Time ( only):    Visit #: 21    Treatment Area: Left shoulder pain [M25.512]    SUBJECTIVE  Pain Level (0-10 scale): \"sore\"  Any medication changes, allergies to medications, adverse drug reactions, diagnosis change, or new procedure performed?: [x] No    [] Yes (see summary sheet for update)  Subjective functional status/changes:   [] No changes reported  Patient reports that he was pretty sore the day after his last visit, but it diminished through the weekend.     OBJECTIVE    Modality rationale: decrease pain and increase tissue extensibility to improve the patients ability to perform ADLs and lifting ability   Min Type Additional Details    [] Estim: []Att   []Unatt        []TENS instruct                  []IFC  []Premod   []NMES                     []Other:  []w/US   []w/ice   []w/heat  Position:  Location:    []  Traction: [] Cervical       []Lumbar                       [] Prone          []Supine                       []Intermittent   []Continuous Lbs:  [] before manual  [] after manual  []w/heat    []  Ultrasound: []Continuous   [] Pulsed at:                           []1MHz   []3MHz Location:  W/cm2:    [] Paraffin         Location:   []w/heat    []  Ice     []  Heat  []  Ice massage Position:seated  Location:L shoulder    []  Laser  []  Other: Position:  Location:   15   [x]  Vasopneumatic Device Pressure:       [x] lo [] med [] hi   Temperature: 34     [x] Skin assessment post-treatment:  [x]intact []redness- no adverse reaction    []redness  adverse reaction:     55 min Therapeutic Exercise:  [x] See flow sheet :   Rationale: increase ROM and increase strength to improve the patients ability to perform ADLs and lifting ability    Other Objective/Functional Measures:        Pain Level (0-10 scale) post treatment:  0/10    ASSESSMENT/Changes in Function:     Patient will continue to benefit from skilled PT services to modify and progress therapeutic interventions, address functional mobility deficits, address ROM deficits, address strength deficits, analyze and address soft tissue restrictions and analyze and cue movement patterns to attain remaining goals. []  See Plan of Care  [x]  See progress note  []  See Discharge Summary    Progress towards goal:  Patient continues to show excellent progress overall and is progressing well through 3rd phase of rehab protocol. PLAN  [x]  Upgrade activities as tolerated     [x]  Continue plan of care  [x]  Update interventions per flow sheet       []  Discharge due to:_  []  Other:_      Prieto Thompson, PT  , DPT, OCS, Cert.  DN   4/23/2018  2:32 PM

## 2018-04-26 ENCOUNTER — HOSPITAL ENCOUNTER (OUTPATIENT)
Dept: PHYSICAL THERAPY | Age: 48
Discharge: HOME | End: 2018-04-26
Payer: COMMERCIAL

## 2018-04-26 PROCEDURE — 97016 VASOPNEUMATIC DEVICE THERAPY: CPT | Performed by: PHYSICAL THERAPIST

## 2018-04-26 PROCEDURE — 97110 THERAPEUTIC EXERCISES: CPT | Performed by: PHYSICAL THERAPIST

## 2018-04-26 NOTE — PROGRESS NOTES
PT DAILY TREATMENT NOTE - [facility] 2-15    Patient Name: Liat Bateman   Date:18  : 1970  [x]  Patient  Verified  Payor: BLUE CROSS / Plan: [place] 5747 PPO / Product Type: PPO /    In time:2:30 PM  Out time: 3:40 PM  Total Treatment Time (min): 70  Total Timed Codes (min): 55  1:1 Treatment Time ( only):    Visit #: 22    Treatment Area: Left shoulder pain [M25.512]    SUBJECTIVE  Pain Level (0-10 scale): \"sore\"  Any medication changes, allergies to medications, adverse drug reactions, diagnosis change, or new procedure performed?: [x] No    [] Yes (see summary sheet for update)  Subjective functional status/changes:   [x] No changes reported      OBJECTIVE    Modality rationale: decrease pain and increase tissue extensibility to improve the patients ability to perform ADLs and lifting ability   Min Type Additional Details    [] Estim: []Att   []Unatt        []TENS instruct                  []IFC  []Premod   []NMES                     []Other:  []w/US   []w/ice   []w/heat  Position:  Location:    []  Traction: [] Cervical       []Lumbar                       [] Prone          []Supine                       []Intermittent   []Continuous Lbs:  [] before manual  [] after manual  []w/heat    []  Ultrasound: []Continuous   [] Pulsed at:                           []1MHz   []3MHz Location:  W/cm2:    [] Paraffin         Location:   []w/heat    []  Ice     []  Heat  []  Ice massage Position:seated  Location:L shoulder    []  Laser  []  Other: Position:  Location:   15   [x]  Vasopneumatic Device Pressure:       [x] lo [] med [] hi   Temperature: 34     [x] Skin assessment post-treatment:  [x]intact []redness- no adverse reaction    []redness  adverse reaction:     55 min Therapeutic Exercise:  [x] See flow sheet :   Rationale: increase ROM and increase strength to improve the patients ability to perform ADLs and lifting ability    Other Objective/Functional Measures:        Pain Level (0-10 scale) post treatment:  0/10    ASSESSMENT/Changes in Function:     Patient will continue to benefit from skilled PT services to modify and progress therapeutic interventions, address functional mobility deficits, address ROM deficits, address strength deficits, analyze and address soft tissue restrictions and analyze and cue movement patterns to attain remaining goals. []  See Plan of Care  [x]  See progress note  []  See Discharge Summary    Progress towards goal:  Patient tolerated today's progression of therapeutic exercises very well and continues to show excellent progress with rehab protocol. PLAN  [x]  Upgrade activities as tolerated     [x]  Continue plan of care  [x]  Update interventions per flow sheet       []  Discharge due to:_  []  Other:_      Rebekah Estrada, PT  , DPT, OCS, Cert.  DN   4/26/2018  2:32 PM

## 2018-04-30 ENCOUNTER — HOSPITAL ENCOUNTER (OUTPATIENT)
Dept: PHYSICAL THERAPY | Age: 48
Discharge: HOME | End: 2018-04-30
Payer: COMMERCIAL

## 2018-04-30 PROCEDURE — 97110 THERAPEUTIC EXERCISES: CPT | Performed by: PHYSICAL THERAPIST

## 2018-04-30 PROCEDURE — 97016 VASOPNEUMATIC DEVICE THERAPY: CPT | Performed by: PHYSICAL THERAPIST

## 2018-04-30 NOTE — PROGRESS NOTES
PT DAILY TREATMENT NOTE - [facility] 2-15    Patient Name: Nikki Juarez. Date:18  : 1970  [x]  Patient  Verified  Payor: BLUE CROSS / Plan: [place] 5747 PPO / Product Type: PPO /    In time:2:30 PM  Out time: 3:40 PM  Total Treatment Time (min): 70  Total Timed Codes (min): 55  1:1 Treatment Time ( only):    Visit #: 23    Treatment Area: Left shoulder pain [M25.512]    SUBJECTIVE  Pain Level (0-10 scale): 0  Any medication changes, allergies to medications, adverse drug reactions, diagnosis change, or new procedure performed?: [x] No    [] Yes (see summary sheet for update)  Subjective functional status/changes:   [] No changes reported  Patient reports that he feels very good today and has no significant pain at this time.     OBJECTIVE    Modality rationale: decrease pain and increase tissue extensibility to improve the patients ability to perform ADLs and lifting ability   Min Type Additional Details    [] Estim: []Att   []Unatt        []TENS instruct                  []IFC  []Premod   []NMES                     []Other:  []w/US   []w/ice   []w/heat  Position:  Location:    []  Traction: [] Cervical       []Lumbar                       [] Prone          []Supine                       []Intermittent   []Continuous Lbs:  [] before manual  [] after manual  []w/heat    []  Ultrasound: []Continuous   [] Pulsed at:                           []1MHz   []3MHz Location:  W/cm2:    [] Paraffin         Location:   []w/heat    []  Ice     []  Heat  []  Ice massage Position:seated  Location:L shoulder    []  Laser  []  Other: Position:  Location:   15   [x]  Vasopneumatic Device Pressure:       [x] lo [] med [] hi   Temperature: 34     [x] Skin assessment post-treatment:  [x]intact []redness- no adverse reaction    []redness  adverse reaction:     55 min Therapeutic Exercise:  [x] See flow sheet :   Rationale: increase ROM and increase strength to improve the patients ability to perform ADLs and lifting ability    Other Objective/Functional Measures:        Pain Level (0-10 scale) post treatment:  0/10    ASSESSMENT/Changes in Function:     Patient will continue to benefit from skilled PT services to modify and progress therapeutic interventions, address functional mobility deficits, address ROM deficits, address strength deficits, analyze and address soft tissue restrictions and analyze and cue movement patterns to attain remaining goals. []  See Plan of Care  [x]  See progress note  []  See Discharge Summary    Progress towards goal:  Patient continues to do very well with a steady progression of resistance used with flexion and abduction strengthening program. Will continue to progress as tolerated over the next several weeks as the patient gets closer to discharge. PLAN  [x]  Upgrade activities as tolerated     [x]  Continue plan of care  [x]  Update interventions per flow sheet       []  Discharge due to:_  []  Other:_      Vandana Ventura, PT  , DPT, OCS, Cert.  DN   4/30/2018  2:32 PM

## 2018-05-03 ENCOUNTER — HOSPITAL ENCOUNTER (OUTPATIENT)
Dept: PHYSICAL THERAPY | Age: 48
Discharge: HOME | End: 2018-05-03
Payer: COMMERCIAL

## 2018-05-03 PROCEDURE — 97110 THERAPEUTIC EXERCISES: CPT | Performed by: PHYSICAL MEDICINE & REHABILITATION

## 2018-05-03 PROCEDURE — 97016 VASOPNEUMATIC DEVICE THERAPY: CPT | Performed by: PHYSICAL MEDICINE & REHABILITATION

## 2018-05-03 NOTE — PROGRESS NOTES
PT DAILY TREATMENT NOTE - [facility] 2-15    Patient Name: Lenny Garcia. Date:18  : 1970  [x]  Patient  Verified  Payor: BLUE CROSS / Plan: [place] 5747 PPO / Product Type: PPO /    In time:2:20 PM  Out time: 3:30 PM  Total Treatment Time (min): 70  Total Timed Codes (min): 55  1:1 Treatment Time ( only):    Visit #: 24    Treatment Area: Left shoulder pain [M25.512]    SUBJECTIVE  Pain Level (0-10 scale): 0  Any medication changes, allergies to medications, adverse drug reactions, diagnosis change, or new procedure performed?: [x] No    [] Yes (see summary sheet for update)  Subjective functional status/changes:   [] No changes reported  Patient reports that he is just pretty sore today.     OBJECTIVE    Modality rationale: decrease pain and increase tissue extensibility to improve the patients ability to perform ADLs and lifting ability   Min Type Additional Details    [] Estim: []Att   []Unatt        []TENS instruct                  []IFC  []Premod   []NMES                     []Other:  []w/US   []w/ice   []w/heat  Position:  Location:    []  Traction: [] Cervical       []Lumbar                       [] Prone          []Supine                       []Intermittent   []Continuous Lbs:  [] before manual  [] after manual  []w/heat    []  Ultrasound: []Continuous   [] Pulsed at:                           []1MHz   []3MHz Location:  W/cm2:    [] Paraffin         Location:   []w/heat    []  Ice     []  Heat  []  Ice massage Position:seated  Location:L shoulder    []  Laser  []  Other: Position:  Location:   15   [x]  Vasopneumatic Device Pressure:       [x] lo [] med [] hi   Temperature: 34     [x] Skin assessment post-treatment:  [x]intact []redness- no adverse reaction    []redness  adverse reaction:     55 min Therapeutic Exercise:  [x] See flow sheet :   Rationale: increase ROM and increase strength to improve the patients ability to perform ADLs and lifting ability    Other Objective/Functional Measures:      Pain Level (0-10 scale) post treatment:  0/10    ASSESSMENT/Changes in Function:     Patient will continue to benefit from skilled PT services to modify and progress therapeutic interventions, address functional mobility deficits, address ROM deficits, address strength deficits, analyze and address soft tissue restrictions and analyze and cue movement patterns to attain remaining goals. []  See Plan of Care  []  See progress note  []  See Discharge Summary    Progress towards goal:  Patient continues to do very well with a steady progression of resistance used with flexion and abduction strengthening program. Will continue to progress as tolerated over the next several weeks as the patient gets closer to discharge.      PLAN  [x]  Upgrade activities as tolerated     [x]  Continue plan of care  [x]  Update interventions per flow sheet       []  Discharge due to:_  []  Other:_      Chauncey Bullock PTA, CPT 5/3/2018  2:32 PM

## 2018-05-07 ENCOUNTER — HOSPITAL ENCOUNTER (OUTPATIENT)
Dept: PHYSICAL THERAPY | Age: 48
Discharge: HOME | End: 2018-05-07
Payer: COMMERCIAL

## 2018-05-07 PROCEDURE — 97016 VASOPNEUMATIC DEVICE THERAPY: CPT | Performed by: PHYSICAL THERAPIST

## 2018-05-07 PROCEDURE — 97110 THERAPEUTIC EXERCISES: CPT | Performed by: PHYSICAL THERAPIST

## 2018-05-07 NOTE — PROGRESS NOTES
PT DAILY TREATMENT NOTE - [facility] 2-15    Patient Name: Lisa Lou.   Date:18  : 1970  [x]  Patient  Verified  Payor: BLUE CROSS / Plan: [place] 5747 PPO / Product Type: PPO /    In time:2:20 PM  Out time: 3:30 PM  Total Treatment Time (min): 70  Total Timed Codes (min): 55  1:1 Treatment Time ( only):    Visit #: 25    Treatment Area: Left shoulder pain [M25.512]    SUBJECTIVE  Pain Level (0-10 scale): 0  Any medication changes, allergies to medications, adverse drug reactions, diagnosis change, or new procedure performed?: [x] No    [] Yes (see summary sheet for update)  Subjective functional status/changes:   [x] No changes reported      OBJECTIVE    Modality rationale: decrease pain and increase tissue extensibility to improve the patients ability to perform ADLs and lifting ability   Min Type Additional Details    [] Estim: []Att   []Unatt        []TENS instruct                  []IFC  []Premod   []NMES                     []Other:  []w/US   []w/ice   []w/heat  Position:  Location:    []  Traction: [] Cervical       []Lumbar                       [] Prone          []Supine                       []Intermittent   []Continuous Lbs:  [] before manual  [] after manual  []w/heat    []  Ultrasound: []Continuous   [] Pulsed at:                           []1MHz   []3MHz Location:  W/cm2:    [] Paraffin         Location:   []w/heat    []  Ice     []  Heat  []  Ice massage Position:seated  Location:L shoulder    []  Laser  []  Other: Position:  Location:   15   [x]  Vasopneumatic Device Pressure:       [x] lo [] med [] hi   Temperature: 34     [x] Skin assessment post-treatment:  [x]intact []redness- no adverse reaction    []redness  adverse reaction:     55 min Therapeutic Exercise:  [x] See flow sheet :   Rationale: increase ROM and increase strength to improve the patients ability to perform ADLs and lifting ability    Other Objective/Functional Measures:      Pain Level (0-10 scale) post treatment:  0/10    ASSESSMENT/Changes in Function:     Patient will continue to benefit from skilled PT services to modify and progress therapeutic interventions, address functional mobility deficits, address ROM deficits, address strength deficits, analyze and address soft tissue restrictions and analyze and cue movement patterns to attain remaining goals. []  See Plan of Care  []  See progress note  []  See Discharge Summary    Progress towards goal:  Patient tolerated today's progression of resistance very well and continues to show excellent progress with long term goals. PLAN  [x]  Upgrade activities as tolerated     [x]  Continue plan of care  [x]  Update interventions per flow sheet       []  Discharge due to:_  []  Other:_      Michael Hill, PT  , DPT, OCS, Cert.  DN   5/7/2018  2:32 PM

## 2018-05-10 ENCOUNTER — HOSPITAL ENCOUNTER (OUTPATIENT)
Dept: PHYSICAL THERAPY | Age: 48
Discharge: HOME | End: 2018-05-10
Payer: COMMERCIAL

## 2018-05-10 PROCEDURE — 97110 THERAPEUTIC EXERCISES: CPT | Performed by: PHYSICAL MEDICINE & REHABILITATION

## 2018-05-10 PROCEDURE — 97016 VASOPNEUMATIC DEVICE THERAPY: CPT | Performed by: PHYSICAL MEDICINE & REHABILITATION

## 2018-05-10 NOTE — PROGRESS NOTES
PT DAILY TREATMENT NOTE - [facility] 2-15    Patient Name: Drew Celis. Date:18  : 1970  [x]  Patient  Verified  Payor: BLUE CROSS / Plan: [place] 5747 PPO / Product Type: PPO /    In time:2:30 PM  Out time: 3:40 PM  Total Treatment Time (min): 70  Total Timed Codes (min): 55  1:1 Treatment Time ( only):    Visit #: 26    Treatment Area: Left shoulder pain [M25.512]    SUBJECTIVE  Pain Level (0-10 scale): 0  Any medication changes, allergies to medications, adverse drug reactions, diagnosis change, or new procedure performed?: [x] No    [] Yes (see summary sheet for update)  Subjective functional status/changes:   [] No changes reported  Patient reports that he is continues to feel better with improved reaching ability.     OBJECTIVE    Modality rationale: decrease pain and increase tissue extensibility to improve the patients ability to perform ADLs and lifting ability   Min Type Additional Details    [] Estim: []Att   []Unatt        []TENS instruct                  []IFC  []Premod   []NMES                     []Other:  []w/US   []w/ice   []w/heat  Position:  Location:    []  Traction: [] Cervical       []Lumbar                       [] Prone          []Supine                       []Intermittent   []Continuous Lbs:  [] before manual  [] after manual  []w/heat    []  Ultrasound: []Continuous   [] Pulsed at:                           []1MHz   []3MHz Location:  W/cm2:    [] Paraffin         Location:   []w/heat    []  Ice     []  Heat  []  Ice massage Position:seated  Location:L shoulder    []  Laser  []  Other: Position:  Location:   15   [x]  Vasopneumatic Device Pressure:       [x] lo [] med [] hi   Temperature: 34     [x] Skin assessment post-treatment:  [x]intact []redness- no adverse reaction    []redness  adverse reaction:     55 min Therapeutic Exercise:  [x] See flow sheet :   Rationale: increase ROM and increase strength to improve the patients ability to perform ADLs and lifting ability    Other Objective/Functional Measures:      Pain Level (0-10 scale) post treatment:  0/10    ASSESSMENT/Changes in Function:     Patient will continue to benefit from skilled PT services to modify and progress therapeutic interventions, address functional mobility deficits, address ROM deficits, address strength deficits, analyze and address soft tissue restrictions and analyze and cue movement patterns to attain remaining goals. []  See Plan of Care  []  See progress note  []  See Discharge Summary    Progress towards goal:  Patient continues to do very well with a steady progression of resistance used with flexion and abduction strengthening program. Will continue to progress as tolerated over the next several weeks as the patient gets closer to discharge.      PLAN  [x]  Upgrade activities as tolerated     [x]  Continue plan of care  [x]  Update interventions per flow sheet       []  Discharge due to:_  []  Other:_      Amna Johnson PTA, CPT 5/10/2018  2:32 PM

## 2018-05-14 ENCOUNTER — APPOINTMENT (OUTPATIENT)
Dept: PHYSICAL THERAPY | Age: 48
End: 2018-05-14
Payer: COMMERCIAL

## 2018-05-15 ENCOUNTER — HOSPITAL ENCOUNTER (OUTPATIENT)
Dept: PHYSICAL THERAPY | Age: 48
Discharge: HOME | End: 2018-05-15
Payer: COMMERCIAL

## 2018-05-15 PROCEDURE — 97016 VASOPNEUMATIC DEVICE THERAPY: CPT | Performed by: PHYSICAL MEDICINE & REHABILITATION

## 2018-05-15 PROCEDURE — 97110 THERAPEUTIC EXERCISES: CPT | Performed by: PHYSICAL MEDICINE & REHABILITATION

## 2018-05-15 NOTE — PROGRESS NOTES
PT DAILY TREATMENT NOTE - [facility] 2-15    Patient Name: Vernell Senior. Date:18  : 1970  [x]  Patient  Verified  Payor: BLUE CROSS / Plan: [place] 5747 PPO / Product Type: PPO /    In time: 820 AM  Out time: 920 AM  Total Treatment Time (min): 60  Total Timed Codes (min): 45  1:1 Treatment Time ( only):    Visit #: 27    Treatment Area: Left shoulder pain [M25.512]    SUBJECTIVE  Pain Level (0-10 scale): 0  Any medication changes, allergies to medications, adverse drug reactions, diagnosis change, or new procedure performed?: [x] No    [] Yes (see summary sheet for update)  Subjective functional status/changes:   [] No changes reported  Patient reports that he is very sore today from having to try to move a smaller tree from the road and didn't get good sleep last night.     OBJECTIVE    Modality rationale: decrease pain and increase tissue extensibility to improve the patients ability to perform ADLs and lifting ability   Min Type Additional Details    [] Estim: []Att   []Unatt        []TENS instruct                  []IFC  []Premod   []NMES                     []Other:  []w/US   []w/ice   []w/heat  Position:  Location:    []  Traction: [] Cervical       []Lumbar                       [] Prone          []Supine                       []Intermittent   []Continuous Lbs:  [] before manual  [] after manual  []w/heat    []  Ultrasound: []Continuous   [] Pulsed at:                           []1MHz   []3MHz Location:  W/cm2:    [] Paraffin         Location:   []w/heat    []  Ice     []  Heat  []  Ice massage Position:seated  Location:L shoulder    []  Laser  []  Other: Position:  Location:   15   [x]  Vasopneumatic Device Pressure:       [x] lo [] med [] hi   Temperature: 34     [x] Skin assessment post-treatment:  [x]intact []redness- no adverse reaction    []redness  adverse reaction:     45 min Therapeutic Exercise:  [x] See flow sheet :   Rationale: increase ROM and increase strength to improve the patients ability to perform ADLs and lifting ability    Other Objective/Functional Measures:   Full AROM in all directions, slight tightness with IR. Held on strength due to significant soreness present today. Pain Level (0-10 scale) post treatment:  0/10    ASSESSMENT/Changes in Function:     Patient will continue to benefit from skilled PT services to modify and progress therapeutic interventions, address functional mobility deficits, address ROM deficits, address strength deficits, analyze and address soft tissue restrictions and analyze and cue movement patterns to attain remaining goals. []  See Plan of Care  [x]  See progress note  []  See Discharge Summary    Progress towards goal:  Patient continues to do very well with a steady progression of resistance used with flexion and abduction strengthening program. Will continue to progress as tolerated over the next several weeks as the patient gets closer to discharge. Short Term Goals: To be accomplished in 8 treatments:  1. Patient will be able to demonstrate full PROM flexion to allow for improved ability to reach overhead. - MET  2. Patient will be able to demonstrate >45 degrees of ER PROM to allow for improved ability to put on a jacket. - MET  3. Patient will be able to sleep through the night without being woken up by shoulder pain. - Not met in bed  Long Term Goals: To be accomplished in 16 treatments:  1. Patient will be able to demonstrate >90 degrees of AROM flexion with <2/10 shoulder pain. - MET  2. Patient will be able to put on a jacket with <2/10 shoulder pain. - Not Met  3. Patient will be able to carry 20# at his left side for 50 ft.  With no pain or limitation. - Not Met    PLAN  [x]  Upgrade activities as tolerated     [x]  Continue plan of care  [x]  Update interventions per flow sheet       []  Discharge due to:_  []  Other:_      Magdalene Cooks , PTA, CPT 5/15/2018  2:32 PM

## 2018-05-16 NOTE — PROGRESS NOTES
[facility]  [place]  [facility]  [place], [address]  Phone: [phone]      Fax:  [phone]    Progress Note    Name: Niraj Obregon. : 1970   MD: Kelley Walsh MD       Treatment Diagnosis: Left shoulder pain [M25.512]  Start of Care: 18    Visits from Start of Care: 27  Missed Visits: 0    Summary of Care: Mr. Diandra Williamson continues to demonstrate good progress through the 3rd phase of his rehab protocol with improved rotator cuff strength and overhead lifting ability. He is having difficulty with performing lifting overhead with weight > 5 lbs. and with reaching behind his back into FIR. He should continue to show excellent progress through the next 4 weeks and achieve all long term goals. AROM: WNL in all directions      Assessment / Recommendations:     Long Term Goals: To be accomplished in 16 treatments:  Progress to 32 visits  1. Patient will be able to demonstrate >90 degrees of AROM flexion with <2/10 shoulder pain. - MET  2. Patient will be able to put on a jacket with <2/10 shoulder pain. - Not Met  3. Patient will be able to carry 20# at his left side for 50 ft. With no pain or limitation. - Not Met    Patient will continue to benefit from an additional 4 weeks for PT to achieve all long term goals and advance through 3rd phase of protocol. Ricardo Andrade, PT , DPT, OCS, Cert. DN   2018 2:54 PM    ________________________________________________________________________  NOTE TO PHYSICIAN:  Please complete the following and fax to: [facility]: [phone]  . Retain this original for your records. If you are unable to process this request in 24 hours, please contact our office.        ____ I have read the above report and request that my patient continue therapy with the following changes/special instructions:  ____ I have read the above report and request that my patient be discharged from therapy    Physician's Signature:_________________ Date:___________Time:__________

## 2018-05-17 ENCOUNTER — HOSPITAL ENCOUNTER (OUTPATIENT)
Dept: PHYSICAL THERAPY | Age: 48
Discharge: HOME | End: 2018-05-17
Payer: COMMERCIAL

## 2018-05-17 PROCEDURE — 97016 VASOPNEUMATIC DEVICE THERAPY: CPT | Performed by: PHYSICAL MEDICINE & REHABILITATION

## 2018-05-17 PROCEDURE — 97110 THERAPEUTIC EXERCISES: CPT | Performed by: PHYSICAL MEDICINE & REHABILITATION

## 2018-05-17 NOTE — PROGRESS NOTES
PT DAILY TREATMENT NOTE - [facility] 2-15    Patient Name: Walker Jimenez. Date:18  : 1970  [x]  Patient  Verified  Payor: BLUE CROSS / Plan: [place] [id number] PPO / Product Type: PPO /    In time: 1100 AM  Out time: 1210 AM  Total Treatment Time (min): 70  Total Timed Codes (min): 55  1:1 Treatment Time ( only):    Visit #: 28    Treatment Area: Left shoulder pain [M25.512]    SUBJECTIVE  Pain Level (0-10 scale): 0  Any medication changes, allergies to medications, adverse drug reactions, diagnosis change, or new procedure performed?: [x] No    [] Yes (see summary sheet for update)  Subjective functional status/changes:   [] No changes reported  Patient reports that he is still sore but doing much better. Patient stated his MD is very happy with his progress and will follow up again on .     OBJECTIVE    Modality rationale: decrease pain and increase tissue extensibility to improve the patients ability to perform ADLs and lifting ability   Min Type Additional Details    [] Estim: []Att   []Unatt        []TENS instruct                  []IFC  []Premod   []NMES                     []Other:  []w/US   []w/ice   []w/heat  Position:  Location:    []  Traction: [] Cervical       []Lumbar                       [] Prone          []Supine                       []Intermittent   []Continuous Lbs:  [] before manual  [] after manual  []w/heat    []  Ultrasound: []Continuous   [] Pulsed at:                           []1MHz   []3MHz Location:  W/cm2:    [] Paraffin         Location:   []w/heat    []  Ice     []  Heat  []  Ice massage Position:seated  Location:L shoulder    []  Laser  []  Other: Position:  Location:   15   [x]  Vasopneumatic Device Pressure:       [x] lo [] med [] hi   Temperature: 34     [x] Skin assessment post-treatment:  [x]intact []redness- no adverse reaction    []redness  adverse reaction:     55 min Therapeutic Exercise:  [x] See flow sheet :   Rationale: increase ROM and increase strength to improve the patients ability to perform ADLs and lifting ability    Other Objective/Functional Measures: Mod fatigue with PNF type movements today but good tolerance overall. Pain Level (0-10 scale) post treatment:  0/10    ASSESSMENT/Changes in Function:     Patient will continue to benefit from skilled PT services to modify and progress therapeutic interventions, address functional mobility deficits, address ROM deficits, address strength deficits, analyze and address soft tissue restrictions and analyze and cue movement patterns to attain remaining goals. []  See Plan of Care  [x]  See progress note  []  See Discharge Summary    Progress towards goal:  Patient continues to do very well with a steady progression of resistance used with flexion and abduction strengthening program. Will continue to progress as tolerated over the next several weeks as the patient gets closer to discharge. Short Term Goals: To be accomplished in 8 treatments:  1. Patient will be able to demonstrate full PROM flexion to allow for improved ability to reach overhead. - MET  2. Patient will be able to demonstrate >45 degrees of ER PROM to allow for improved ability to put on a jacket. - MET  3. Patient will be able to sleep through the night without being woken up by shoulder pain. - Not met in bed  Long Term Goals: To be accomplished in 16 treatments:  1. Patient will be able to demonstrate >90 degrees of AROM flexion with <2/10 shoulder pain. - MET  2. Patient will be able to put on a jacket with <2/10 shoulder pain. - Not Met  3. Patient will be able to carry 20# at his left side for 50 ft.  With no pain or limitation. - Not Met    PLAN  [x]  Upgrade activities as tolerated     [x]  Continue plan of care  [x]  Update interventions per flow sheet       []  Discharge due to:_  []  Other:_      Clementina Ramires PTA, CPT 5/17/2018  2:32 PM

## 2018-05-21 ENCOUNTER — HOSPITAL ENCOUNTER (OUTPATIENT)
Dept: PHYSICAL THERAPY | Age: 48
Discharge: HOME | End: 2018-05-21
Payer: COMMERCIAL

## 2018-05-21 PROCEDURE — 97110 THERAPEUTIC EXERCISES: CPT | Performed by: PHYSICAL THERAPIST

## 2018-05-21 PROCEDURE — 97016 VASOPNEUMATIC DEVICE THERAPY: CPT | Performed by: PHYSICAL THERAPIST

## 2018-05-21 NOTE — PROGRESS NOTES
PT DAILY TREATMENT NOTE - [facility] 2-15    Patient Name: Brinda Ahumada. Date:18  : 1970  [x]  Patient  Verified  Payor: BLUE CROSS / Plan: [employer] [place] [id number] PPO / Product Type: PPO /    In time: 10:00 AM  Out time: 11:10 AM  Total Treatment Time (min): 70  Total Timed Codes (min): 55  1:1 Treatment Time ( only):    Visit #: 29    Treatment Area: Left shoulder pain [M25.512]    SUBJECTIVE  Pain Level (0-10 scale): 0  Any medication changes, allergies to medications, adverse drug reactions, diagnosis change, or new procedure performed?: [x] No    [] Yes (see summary sheet for update)  Subjective functional status/changes:   [] No changes reported  Patient is sore today, but otherwise doing fine. 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     OBJECTIVE    Modality rationale: decrease pain and increase tissue extensibility to improve the patients ability to perform ADLs and lifting ability   Min Type Additional Details    [] Estim: []Att   []Unatt        []TENS instruct                  []IFC  []Premod   []NMES                     []Other:  []w/US   []w/ice   []w/heat  Position:  Location:    []  Traction: [] Cervical       []Lumbar                       [] Prone          []Supine                       []Intermittent   []Continuous Lbs:  [] before manual  [] after manual  []w/heat    []  Ultrasound: []Continuous   [] Pulsed at:                           []1MHz   []3MHz Location:  W/cm2:    [] Paraffin         Location:   []w/heat    []  Ice     []  Heat  []  Ice massage Position:seated  Location:L shoulder    []  Laser  []  Other: Position:  Location:   15   [x]  Vasopneumatic Device Pressure:       [x] lo [] med [] hi   Temperature: 34     [x] Skin assessment post-treatment:  [x]intact []redness- no adverse reaction    []redness  adverse reaction:     55 min Therapeutic Exercise:  [x] See flow sheet :   Rationale: increase ROM and increase strength to improve the patients ability to perform ADLs and lifting ability    Other Objective/Functional Measures:     Pain Level (0-10 scale) post treatment:  0/10    ASSESSMENT/Changes in Function:     Patient will continue to benefit from skilled PT services to modify and progress therapeutic interventions, address functional mobility deficits, address ROM deficits, address strength deficits, analyze and address soft tissue restrictions and analyze and cue movement patterns to attain remaining goals. []  See Plan of Care  [x]  See progress note  []  See Discharge Summary    Progress towards goal:  Patient tolerated today's progression of therapeutic exercises very well and will be returning to work soon. Will focus on improving improved carrying capacity and overhead lifting ability. PLAN  [x]  Upgrade activities as tolerated     [x]  Continue plan of care  [x]  Update interventions per flow sheet       []  Discharge due to:_  []  Other:_      Toyin Garcia, PT  , DPT, OCS, Cert.  DN   5/21/2018  2:32 PM

## 2018-05-24 ENCOUNTER — HOSPITAL ENCOUNTER (OUTPATIENT)
Dept: PHYSICAL THERAPY | Age: 48
Discharge: HOME | End: 2018-05-24
Payer: COMMERCIAL

## 2018-05-24 PROCEDURE — 97110 THERAPEUTIC EXERCISES: CPT | Performed by: PHYSICAL MEDICINE & REHABILITATION

## 2018-05-24 PROCEDURE — 97016 VASOPNEUMATIC DEVICE THERAPY: CPT | Performed by: PHYSICAL MEDICINE & REHABILITATION

## 2018-05-24 NOTE — PROGRESS NOTES
PT DAILY TREATMENT NOTE - [facility] 2-15    Patient Name: Guy Lynn.   Date:18  : 1970  [x]  Patient  Verified  Payor: BLUE CROSS / Plan: [employer] [place] [id number] PPO / Product Type: PPO /    In time: 400 PM  Out time: 510 PM  Total Treatment Time (min): 70  Total Timed Codes (min): 55  1:1 Treatment Time ( only):    Visit #: 30    Treatment Area: Left shoulder pain [M25.512]    SUBJECTIVE  Pain Level (0-10 scale): 0  Any medication changes, allergies to medications, adverse drug reactions, diagnosis change, or new procedure performed?: [x] No    [] Yes (see summary sheet for update)  Subjective functional status/changes:   [] No changes reported  Patient reported being sore after last visit but nothing unusual.    OBJECTIVE    Modality rationale: decrease pain and increase tissue extensibility to improve the patients ability to perform ADLs and lifting ability   Min Type Additional Details    [] Estim: []Att   []Unatt        []TENS instruct                  []IFC  []Premod   []NMES                     []Other:  []w/US   []w/ice   []w/heat  Position:  Location:    []  Traction: [] Cervical       []Lumbar                       [] Prone          []Supine                       []Intermittent   []Continuous Lbs:  [] before manual  [] after manual  []w/heat    []  Ultrasound: []Continuous   [] Pulsed at:                           []1MHz   []3MHz Location:  W/cm2:    [] Paraffin         Location:   []w/heat    []  Ice     []  Heat  []  Ice massage Position:seated  Location:L shoulder    []  Laser  []  Other: Position:  Location:   15   [x]  Vasopneumatic Device Pressure:       [x] lo [] med [] hi   Temperature: 34     [x] Skin assessment post-treatment:  [x]intact []redness- no adverse reaction    []redness  adverse reaction:     55 min Therapeutic Exercise:  [x] See flow sheet :   Rationale: increase ROM and increase strength to improve the patients ability to perform ADLs and lifting ability    Other Objective/Functional Measures:     Pain Level (0-10 scale) post treatment:  0/10    ASSESSMENT/Changes in Function:     Patient will continue to benefit from skilled PT services to modify and progress therapeutic interventions, address functional mobility deficits, address ROM deficits, address strength deficits, analyze and address soft tissue restrictions and analyze and cue movement patterns to attain remaining goals. []  See Plan of Care  [x]  See progress note  []  See Discharge Summary    Progress towards goal:  Patient tolerated today's progression of therapeutic exercises very well and will be returning to work soon. Will focus on improving improved carrying capacity and overhead lifting ability.       PLAN  [x]  Upgrade activities as tolerated     [x]  Continue plan of care  [x]  Update interventions per flow sheet       []  Discharge due to:_  []  Other:_      Amna Johnson PTA, CPT 5/24/2018  2:32 PM

## 2018-05-31 ENCOUNTER — HOSPITAL ENCOUNTER (OUTPATIENT)
Dept: PHYSICAL THERAPY | Age: 48
Discharge: HOME | End: 2018-05-31
Payer: COMMERCIAL

## 2018-05-31 PROCEDURE — 97110 THERAPEUTIC EXERCISES: CPT | Performed by: PHYSICAL THERAPIST

## 2018-05-31 PROCEDURE — 97016 VASOPNEUMATIC DEVICE THERAPY: CPT | Performed by: PHYSICAL THERAPIST

## 2018-05-31 NOTE — PROGRESS NOTES
PT DAILY TREATMENT NOTE - [facility] 2-15    Patient Name: Guy Lynn. Date:18  : 1970  [x]  Patient  Verified  Payor: BLUE CROSS / Plan: [employer] [place] [id number] PPO / Product Type: PPO /    In time: 4:40 PM  Out time: 5:50 PM  Total Treatment Time (min): 70  Total Timed Codes (min): 55   Visit #: 31    Treatment Area: Left shoulder pain [M25.512]    SUBJECTIVE  Pain Level (0-10 scale): 0  Any medication changes, allergies to medications, adverse drug reactions, diagnosis change, or new procedure performed?: [x] No    [] Yes (see summary sheet for update)  Subjective functional status/changes:   [] No changes reported  Patient has returned to limited status at work which he is tolerating well. Patient is motivated to return to full responsibilities at work. Patient has about 1 month before re-evaluation for full return to work.      OBJECTIVE    Modality rationale: decrease pain and increase tissue extensibility to improve the patients ability to perform ADLs and lifting ability   Min Type Additional Details    [] Estim: []Att   []Unatt        []TENS instruct                  []IFC  []Premod   []NMES                     []Other:  []w/US   []w/ice   []w/heat  Position:  Location:    []  Traction: [] Cervical       []Lumbar                       [] Prone          []Supine                       []Intermittent   []Continuous Lbs:  [] before manual  [] after manual  []w/heat    []  Ultrasound: []Continuous   [] Pulsed at:                           []1MHz   []3MHz Location:  W/cm2:    [] Paraffin         Location:   []w/heat    []  Ice     []  Heat  []  Ice massage Position:seated  Location:L shoulder    []  Laser  []  Other: Position:  Location:   15   [x]  Vasopneumatic Device Pressure:       [x] lo [] med [] hi   Temperature: 34     [x] Skin assessment post-treatment:  [x]intact []redness- no adverse reaction    []redness  adverse reaction:     55 min Therapeutic Exercise:  [x] See flow sheet : Rationale: increase ROM and increase strength to improve the patients ability to perform ADLs and lifting ability    Other Objective/Functional Measures:     Pain Level (0-10 scale) post treatment:  0/10    ASSESSMENT/Changes in Function:     Patient will continue to benefit from skilled PT services to modify and progress therapeutic interventions, address functional mobility deficits, address ROM deficits, address strength deficits, analyze and address soft tissue restrictions and analyze and cue movement patterns to attain remaining goals. []  See Plan of Care  []  See progress note  []  See Discharge Summary    Progress towards goal:  Patient has a limited return to work and is progressing well to return to full responsibilities. Will continue to focus on improving carrying capacity and overhead lifting ability.     PLAN  [x]  Upgrade activities as tolerated     [x]  Continue plan of care  []  Update interventions per flow sheet       []  Discharge due to:_  []  Other:_      PAUL Gannon 5/31/2018  2:32 PM    CHARGES:  1 VC  3 TE

## 2018-06-04 ENCOUNTER — HOSPITAL ENCOUNTER (OUTPATIENT)
Dept: PHYSICAL THERAPY | Age: 48
Discharge: HOME | End: 2018-06-04
Payer: COMMERCIAL

## 2018-06-04 PROCEDURE — 97110 THERAPEUTIC EXERCISES: CPT

## 2018-06-04 PROCEDURE — 97016 VASOPNEUMATIC DEVICE THERAPY: CPT

## 2018-06-04 NOTE — PROGRESS NOTES
PT DAILY TREATMENT NOTE - [facility] 2-15    Patient Name: Guy Lynn.   Date:18  : 1970  [x]  Patient  Verified  Payor: BLUE CROSS / Plan: [employer] [place] 5747 PPO / Product Type: PPO /    In time: 4:50p  Out time: 6:00p  Total Treatment Time (min): 70  Total Timed Codes (min): 55  Visit #: 32    Treatment Area: Left shoulder pain [M25.512]    SUBJECTIVE  Pain Level (0-10 scale): 0   Any medication changes, allergies to medications, adverse drug reactions, diagnosis change, or new procedure performed?: [x] No    [] Yes (see summary sheet for update)  Subjective functional status/changes:   [x] No changes reported       OBJECTIVE    Modality rationale: decrease pain and increase tissue extensibility to improve the patients ability to perform ADLs and lifting ability   Min Type Additional Details    [] Estim: []Att   []Unatt        []TENS instruct                  []IFC  []Premod   []NMES                     []Other:  []w/US   []w/ice   []w/heat  Position:  Location:    []  Traction: [] Cervical       []Lumbar                       [] Prone          []Supine                       []Intermittent   []Continuous Lbs:  [] before manual  [] after manual  []w/heat    []  Ultrasound: []Continuous   [] Pulsed at:                           []1MHz   []3MHz Location:  W/cm2:    [] Paraffin         Location:   []w/heat    []  Ice     []  Heat  []  Ice massage Position:seated  Location:L shoulder    []  Laser  []  Other: Position:  Location:   15   [x]  Vasopneumatic Device Pressure:       [x] lo [] med [] hi   Temperature: 34     [x] Skin assessment post-treatment:  [x]intact []redness- no adverse reaction    []redness  adverse reaction:     55 min Therapeutic Exercise:  [x] See flow sheet :   Rationale: increase ROM and increase strength to improve the patients ability to perform ADLs and lifting ability    Other Objective/Functional Measures:     Pain Level (0-10 scale) post treatment: 0/10    ASSESSMENT/Changes in Function:     Patient will continue to benefit from skilled PT services to modify and progress therapeutic interventions, address functional mobility deficits, address ROM deficits, address strength deficits, analyze and address soft tissue restrictions and analyze and cue movement patterns to attain remaining goals. []  See Plan of Care  []  See progress note  []  See Discharge Summary    Progress towards goal:   Patient able to tolerate advancement of exercises with min fatigue noted. Patient making good progress and will do well with continued progression to improve overhead lifting tolerance.     PLAN  [x]  Upgrade activities as tolerated     [x]  Continue plan of care  []  Update interventions per flow sheet       []  Discharge due to:_  []  Other:_      Rebeka Willis PTA 6/4/2018  2:32 PM

## 2018-06-07 ENCOUNTER — HOSPITAL ENCOUNTER (OUTPATIENT)
Dept: PHYSICAL THERAPY | Age: 48
Discharge: HOME | End: 2018-06-07
Payer: COMMERCIAL

## 2018-06-07 PROCEDURE — 97016 VASOPNEUMATIC DEVICE THERAPY: CPT

## 2018-06-07 PROCEDURE — 97110 THERAPEUTIC EXERCISES: CPT

## 2018-06-07 NOTE — PROGRESS NOTES
PT DAILY TREATMENT NOTE - [facility] 2-15    Patient Name: Chelsie Torres.   Date:18  : 1970  [x]  Patient  Verified  Payor: BLUE CROSS / Plan: [employer] [place] 5747 PPO / Product Type: PPO /    In time:4:45p  Out time: 5:55p  Total Treatment Time (min): 70  Total Timed Codes (min): 55  Visit #: 33    Treatment Area: Left shoulder pain [M25.512]    SUBJECTIVE  Pain Level (0-10 scale): 0   Any medication changes, allergies to medications, adverse drug reactions, diagnosis change, or new procedure performed?: [x] No    [] Yes (see summary sheet for update)  Subjective functional status/changes:   [x] No changes reported       OBJECTIVE    Modality rationale: decrease pain and increase tissue extensibility to improve the patients ability to perform ADLs and lifting ability   Min Type Additional Details    [] Estim: []Att   []Unatt        []TENS instruct                  []IFC  []Premod   []NMES                     []Other:  []w/US   []w/ice   []w/heat  Position:  Location:    []  Traction: [] Cervical       []Lumbar                       [] Prone          []Supine                       []Intermittent   []Continuous Lbs:  [] before manual  [] after manual  []w/heat    []  Ultrasound: []Continuous   [] Pulsed at:                           []1MHz   []3MHz Location:  W/cm2:    [] Paraffin         Location:   []w/heat    []  Ice     []  Heat  []  Ice massage Position:seated  Location:L shoulder    []  Laser  []  Other: Position:  Location:   15   [x]  Vasopneumatic Device Pressure:       [x] lo [] med [] hi   Temperature: 34     [x] Skin assessment post-treatment:  [x]intact []redness- no adverse reaction    []redness  adverse reaction:     55 min Therapeutic Exercise:  [x] See flow sheet :   Rationale: increase ROM and increase strength to improve the patients ability to perform ADLs and lifting ability    Other Objective/Functional Measures:     Pain Level (0-10 scale) post treatment: 0/10    ASSESSMENT/Changes in Function:     Patient will continue to benefit from skilled PT services to modify and progress therapeutic interventions, address functional mobility deficits, address ROM deficits, address strength deficits, analyze and address soft tissue restrictions and analyze and cue movement patterns to attain remaining goals. []  See Plan of Care  []  See progress note  []  See Discharge Summary    Progress towards goal:   Patient able to tolerate advancement of exercises with min fatigue noted. Patient making good progress and will do well with continued progression to improve overhead lifting tolerance.     PLAN  [x]  Upgrade activities as tolerated     [x]  Continue plan of care  []  Update interventions per flow sheet       []  Discharge due to:_  []  Other:_      Nova Edwards, PTA 6/7/2018  2:32 PM

## 2018-06-11 ENCOUNTER — HOSPITAL ENCOUNTER (OUTPATIENT)
Dept: PHYSICAL THERAPY | Age: 48
Discharge: HOME | End: 2018-06-11
Payer: COMMERCIAL

## 2018-06-11 PROCEDURE — 97110 THERAPEUTIC EXERCISES: CPT | Performed by: PHYSICAL MEDICINE & REHABILITATION

## 2018-06-11 PROCEDURE — 97016 VASOPNEUMATIC DEVICE THERAPY: CPT | Performed by: PHYSICAL MEDICINE & REHABILITATION

## 2018-06-11 NOTE — PROGRESS NOTES
PT DAILY TREATMENT NOTE - [facility] 2-15    Patient Name: Rosalva Stinson. Date:18  : 1970  [x]  Patient  Verified  Payor: BLUE CROSS / Plan: [employer] [place] [id number] PPO / Product Type: PPO /    In time:500 pm  Out time: 610 pm  Total Treatment Time (min): 70  Total Timed Codes (min): 55  Visit #: 34    Treatment Area: Left shoulder pain [M25.512]    SUBJECTIVE  Pain Level (0-10 scale): 0   Any medication changes, allergies to medications, adverse drug reactions, diagnosis change, or new procedure performed?: [x] No    [] Yes (see summary sheet for update)  Subjective functional status/changes:   [] No changes reported  Patient reported that he is just sore today.      OBJECTIVE    Modality rationale: decrease pain and increase tissue extensibility to improve the patients ability to perform ADLs and lifting ability   Min Type Additional Details    [] Estim: []Att   []Unatt        []TENS instruct                  []IFC  []Premod   []NMES                     []Other:  []w/US   []w/ice   []w/heat  Position:  Location:    []  Traction: [] Cervical       []Lumbar                       [] Prone          []Supine                       []Intermittent   []Continuous Lbs:  [] before manual  [] after manual  []w/heat    []  Ultrasound: []Continuous   [] Pulsed at:                           []1MHz   []3MHz Location:  W/cm2:    [] Paraffin         Location:   []w/heat    []  Ice     []  Heat  []  Ice massage Position:seated  Location:L shoulder    []  Laser  []  Other: Position:  Location:   15   [x]  Vasopneumatic Device Pressure:       [x] lo [] med [] hi   Temperature: 34     [x] Skin assessment post-treatment:  [x]intact []redness- no adverse reaction    []redness  adverse reaction:     55 min Therapeutic Exercise:  [x] See flow sheet :   Rationale: increase ROM and increase strength to improve the patients ability to perform ADLs and lifting ability    Other Objective/Functional Measures:     Pain Level (0-10 scale) post treatment:  0/10    ASSESSMENT/Changes in Function:     Patient will continue to benefit from skilled PT services to modify and progress therapeutic interventions, address functional mobility deficits, address ROM deficits, address strength deficits, analyze and address soft tissue restrictions and analyze and cue movement patterns to attain remaining goals. []  See Plan of Care  []  See progress note  []  See Discharge Summary    Progress towards goal:   Patient able to tolerate advancement of exercises with min fatigue noted. Patient making good progress and will do well with continued progression to improve overhead lifting tolerance.     PLAN  [x]  Upgrade activities as tolerated     [x]  Continue plan of care  []  Update interventions per flow sheet       []  Discharge due to:_  []  Other:_      Maximus Dumont PTA, CPT 6/11/2018  2:32 PM

## 2018-06-14 ENCOUNTER — HOSPITAL ENCOUNTER (OUTPATIENT)
Dept: PHYSICAL THERAPY | Age: 48
Discharge: HOME | End: 2018-06-14
Payer: COMMERCIAL

## 2018-06-14 PROCEDURE — 97016 VASOPNEUMATIC DEVICE THERAPY: CPT

## 2018-06-14 PROCEDURE — 97110 THERAPEUTIC EXERCISES: CPT

## 2018-06-14 NOTE — PROGRESS NOTES
PT DAILY TREATMENT NOTE - [facility] 2-15    Patient Name: Nicolasa Ormond. Date:18  : 1970  [x]  Patient  Verified  Payor: BLUE CROSS / Plan: [place] [id number] PPO / Product Type: PPO /    In time:4:55p  Out time: 5:05p  Total Treatment Time (min): 70  Total Timed Codes (min): 55  Visit #: 34    Treatment Area: Left shoulder pain [M25.512]    SUBJECTIVE  Pain Level (0-10 scale): 0   Any medication changes, allergies to medications, adverse drug reactions, diagnosis change, or new procedure performed?: [x] No    [] Yes (see summary sheet for update)  Subjective functional status/changes:   [] No changes reported  Patient reports he was only a little sore after last session and feels good today.      OBJECTIVE    Modality rationale: decrease pain and increase tissue extensibility to improve the patients ability to perform ADLs and lifting ability   Min Type Additional Details    [] Estim: []Att   []Unatt        []TENS instruct                  []IFC  []Premod   []NMES                     []Other:  []w/US   []w/ice   []w/heat  Position:  Location:    []  Traction: [] Cervical       []Lumbar                       [] Prone          []Supine                       []Intermittent   []Continuous Lbs:  [] before manual  [] after manual  []w/heat    []  Ultrasound: []Continuous   [] Pulsed at:                           []1MHz   []3MHz Location:  W/cm2:    [] Paraffin         Location:   []w/heat    []  Ice     []  Heat  []  Ice massage Position:seated  Location:L shoulder    []  Laser  []  Other: Position:  Location:   15   [x]  Vasopneumatic Device Pressure:       [x] lo [] med [] hi   Temperature: 34     [x] Skin assessment post-treatment:  [x]intact []redness- no adverse reaction    []redness  adverse reaction:     55 min Therapeutic Exercise:  [x] See flow sheet :   Rationale: increase ROM and increase strength to improve the patients ability to perform ADLs and lifting ability    Other Objective/Functional Measures:     Pain Level (0-10 scale) post treatment:  0/10    ASSESSMENT/Changes in Function:     Patient will continue to benefit from skilled PT services to modify and progress therapeutic interventions, address functional mobility deficits, address ROM deficits, address strength deficits, analyze and address soft tissue restrictions and analyze and cue movement patterns to attain remaining goals. []  See Plan of Care  []  See progress note  []  See Discharge Summary    Progress towards goal:   Patient able to tolerate advancement of exercises with min fatigue noted. Patient making good progress and will do well with continued progression to improve overhead lifting tolerance.     PLAN  [x]  Upgrade activities as tolerated     [x]  Continue plan of care  []  Update interventions per flow sheet       []  Discharge due to:_  []  Other:_      Ash Adhikari PTA 6/14/2018  5:32 PM

## 2018-06-18 ENCOUNTER — APPOINTMENT (OUTPATIENT)
Dept: PHYSICAL THERAPY | Age: 48
End: 2018-06-18
Payer: COMMERCIAL

## 2018-06-21 ENCOUNTER — HOSPITAL ENCOUNTER (OUTPATIENT)
Dept: PHYSICAL THERAPY | Age: 48
Discharge: HOME | End: 2018-06-21
Payer: COMMERCIAL

## 2018-06-21 PROCEDURE — 97016 VASOPNEUMATIC DEVICE THERAPY: CPT | Performed by: PHYSICAL THERAPIST

## 2018-06-21 PROCEDURE — 97110 THERAPEUTIC EXERCISES: CPT | Performed by: PHYSICAL THERAPIST

## 2018-06-21 NOTE — PROGRESS NOTES
PT DAILY TREATMENT NOTE - [facility] 2-15    Patient Name: Rosy Carrillo. Date:18  : 1970  [x]  Patient  Verified  Payor: BLUE CROSS / Plan: [place] [id number] PPO / Product Type: PPO /    In time: 4:55 PM  Out time: 6:05 PM  Total Treatment Time (min): 70  Total Timed Codes (min): 55  Visit #: 36    Treatment Area: Left shoulder pain [M25.512]    SUBJECTIVE  Pain Level (0-10 scale): 0   Any medication changes, allergies to medications, adverse drug reactions, diagnosis change, or new procedure performed?: [x] No    [] Yes (see summary sheet for update)  Subjective functional status/changes:   [] No changes reported  Patient reports that he is able to sleep on his left side without discomfort. Patient reports that soreness has been decreasing in the past week.         OBJECTIVE    Modality rationale: decrease pain and increase tissue extensibility to improve the patients ability to perform ADLs and lifting ability   Min Type Additional Details    [] Estim: []Att   []Unatt        []TENS instruct                  []IFC  []Premod   []NMES                     []Other:  []w/US   []w/ice   []w/heat  Position:  Location:    []  Traction: [] Cervical       []Lumbar                       [] Prone          []Supine                       []Intermittent   []Continuous Lbs:  [] before manual  [] after manual  []w/heat    []  Ultrasound: []Continuous   [] Pulsed at:                           []1MHz   []3MHz Location:  W/cm2:    [] Paraffin         Location:   []w/heat    []  Ice     []  Heat  []  Ice massage Position:seated  Location:L shoulder    []  Laser  []  Other: Position:  Location:   15   [x]  Vasopneumatic Device Pressure:       [x] lo [] med [] hi   Temperature: 34     [x] Skin assessment post-treatment:  [x]intact []redness- no adverse reaction    []redness  adverse reaction:     55 min Therapeutic Exercise:  [x] See flow sheet :   Rationale: increase ROM and increase strength to improve the patients ability to perform ADLs and lifting ability    Other Objective/Functional Measures:     Pain Level (0-10 scale) post treatment:  0/10    ASSESSMENT/Changes in Function:     Patient will continue to benefit from skilled PT services to modify and progress therapeutic interventions, address functional mobility deficits, address ROM deficits, address strength deficits, analyze and address soft tissue restrictions and analyze and cue movement patterns to attain remaining goals. []  See Plan of Care  []  See progress note  []  See Discharge Summary    Progress towards goal:   Patient tolerated all exercise progression of exercises with very minimal fatigue. Patient is making progress towards being able to carry his tool box for his work tasks.      PLAN  [x]  Upgrade activities as tolerated     [x]  Continue plan of care  []  Update interventions per flow sheet       []  Discharge due to:_  []  Other:_      PAUL Gannon  6/21/2018  5:32 PM

## 2018-06-25 ENCOUNTER — HOSPITAL ENCOUNTER (OUTPATIENT)
Dept: PHYSICAL THERAPY | Age: 48
Discharge: HOME | End: 2018-06-25
Payer: COMMERCIAL

## 2018-06-25 PROCEDURE — 97016 VASOPNEUMATIC DEVICE THERAPY: CPT | Performed by: PHYSICAL MEDICINE & REHABILITATION

## 2018-06-25 PROCEDURE — 97110 THERAPEUTIC EXERCISES: CPT | Performed by: PHYSICAL MEDICINE & REHABILITATION

## 2018-06-25 NOTE — PROGRESS NOTES
PT DAILY TREATMENT NOTE - [facility] 2-15    Patient Name: Drew Celis. Date:18  : 1970  [x]  Patient  Verified  Payor: BLUE CROSS / Plan: [employer] [place] [id number] PPO / Product Type: PPO /    In time: 445 PM  Out time: 555 PM  Total Treatment Time (min): 70  Total Timed Codes (min): 55  Visit #: 37    Treatment Area: Left shoulder pain [M25.512]    SUBJECTIVE  Pain Level (0-10 scale): 0   Any medication changes, allergies to medications, adverse drug reactions, diagnosis change, or new procedure performed?: [x] No    [] Yes (see summary sheet for update)  Subjective functional status/changes:   [] No changes reported  Patient reports that he is just a little sore from sleeping on his shoulder. Patient stated he is hoping to get released when he sees his MD Wednesday.          OBJECTIVE    Modality rationale: decrease pain and increase tissue extensibility to improve the patients ability to perform ADLs and lifting ability   Min Type Additional Details    [] Estim: []Att   []Unatt        []TENS instruct                  []IFC  []Premod   []NMES                     []Other:  []w/US   []w/ice   []w/heat  Position:  Location:    []  Traction: [] Cervical       []Lumbar                       [] Prone          []Supine                       []Intermittent   []Continuous Lbs:  [] before manual  [] after manual  []w/heat    []  Ultrasound: []Continuous   [] Pulsed at:                           []1MHz   []3MHz Location:  W/cm2:    [] Paraffin         Location:   []w/heat    []  Ice     []  Heat  []  Ice massage Position:seated  Location:L shoulder    []  Laser  []  Other: Position:  Location:   15   [x]  Vasopneumatic Device Pressure:       [x] lo [] med [] hi   Temperature: 34     [x] Skin assessment post-treatment:  [x]intact []redness- no adverse reaction    []redness  adverse reaction:     55 min Therapeutic Exercise:  [x] See flow sheet :   Rationale: increase ROM and increase strength to improve the patients ability to perform ADLs and lifting ability    Other Objective/Functional Measures:   Full AROM    MMT 5/5  Pain Level (0-10 scale) post treatment:  0/10    ASSESSMENT/Changes in Function:     Patient will continue to benefit from skilled PT services to modify and progress therapeutic interventions, address functional mobility deficits, address ROM deficits, address strength deficits, analyze and address soft tissue restrictions and analyze and cue movement patterns to attain remaining goals. []  See Plan of Care  []  See progress note  []  See Discharge Summary    Progress towards goal:   Patient tolerated all exercise progression of exercises with very minimal fatigue. Patient is making progress towards being able to carry his tool box for his work tasks.      PLAN  [x]  Upgrade activities as tolerated     [x]  Continue plan of care  [x]  Update interventions per flow sheet       []  Discharge due to:_  []  Other:_      Juan Ramon Conrad PTA, CPT  6/25/2018  5:32 PM

## 2018-06-26 NOTE — PROGRESS NOTES
[facility]  [place]  [address], [address]  Phone: [phone]      Fax:  [phone]    Progress Note    Name: Chelsie Salinas. : 1970   MD: Lucila Johnson MD       Treatment Diagnosis: Left shoulder pain [M25.512]  Start of Care: 18    Visits from Start of Care: 37  Missed Visits: 0    Summary of Care: Mr. Vivian Su has done very well with PT and has achieved all short term and long term goals set at initial evaluation. He has seen excellent gains in overhead strength, but continues to be limited in job tasks where he is utilizing tools overhead or climbing a ladder. AROM:   Full AROM     Strength: MMT 5/5 at all shoulder musculature      Assessment / Recommendations:     Long Term Goals: To be accomplished in 16 treatments:  Progress to 48 visits  1. Patient will be able to demonstrate >90 degrees of AROM flexion with <2/10 shoulder pain. - MET  2. Patient will be able to put on a jacket with <2/10 shoulder pain. - MET  3. Patient will be able to carry 20# at his left side for 50 ft. With no pain or limitation.  - MET    Advanced Long Term Goals:  1. Patient will be able to climb a 25 ft. Ladder with no shoulder pain or limitation. 2. Patient will be able to press 10# up over his head from a supine position (bench press motion) with no pain or limitation. 3. Patient will be able to lift 5# over his head with no pain or limitation. Patient will continue to benefit from an additional PT over the next several weeks to achieve all advanced long term goals and return to work at full capacity with no pain. Kelly Dunlap, PT , DPT, OCS, Cert. DN   2018 2:54 PM    ________________________________________________________________________  NOTE TO PHYSICIAN:  Please complete the following and fax to: [facility]: [phone]  . Retain this original for your records. If you are unable to process this request in 24 hours, please contact our office.        ____ I have read the above report and request that my patient continue therapy with the following changes/special instructions:  ____ I have read the above report and request that my patient be discharged from therapy    Physician's Signature:_________________ Date:___________Time:__________

## 2018-06-28 ENCOUNTER — HOSPITAL ENCOUNTER (OUTPATIENT)
Dept: PHYSICAL THERAPY | Age: 48
Discharge: HOME | End: 2018-06-28
Payer: COMMERCIAL

## 2018-06-28 PROCEDURE — 97110 THERAPEUTIC EXERCISES: CPT | Performed by: PHYSICAL THERAPIST

## 2018-06-28 PROCEDURE — 97016 VASOPNEUMATIC DEVICE THERAPY: CPT | Performed by: PHYSICAL THERAPIST

## 2018-06-28 NOTE — PROGRESS NOTES
PT DAILY TREATMENT NOTE - [facility] 2-15    Patient Name: Lali Polanco. Date:18  : 1970  [x]  Patient  Verified  Payor: BLUE CROSS / Plan: [employer] [place] 5747 PPO / Product Type: PPO /    In time: 4:40 PM  Out time: 5:45 PM  Total Treatment Time (min): 65   Total Timed Codes (min): 50  Visit #: 38    Treatment Area: Left shoulder pain [M25.512]    SUBJECTIVE  Pain Level (0-10 scale): 0    Any medication changes, allergies to medications, adverse drug reactions, diagnosis change, or new procedure performed?: [x] No    [] Yes (see summary sheet for update)  Subjective functional status/changes:   [] No changes reported  Patient reports that his soreness is inconsistent and does not impact his work or ADLs. Patient is not taking any medication. Patient reports that functional ER is still difficult, but is getting much easier.         OBJECTIVE    Modality rationale: decrease pain and increase tissue extensibility to improve the patients ability to perform ADLs and lifting ability   Min Type Additional Details    [] Estim: []Att   []Unatt        []TENS instruct                  []IFC  []Premod   []NMES                     []Other:  []w/US   []w/ice   []w/heat  Position:  Location:    []  Traction: [] Cervical       []Lumbar                       [] Prone          []Supine                       []Intermittent   []Continuous Lbs:  [] before manual  [] after manual  []w/heat    []  Ultrasound: []Continuous   [] Pulsed at:                           []1MHz   []3MHz Location:  W/cm2:    [] Paraffin         Location:   []w/heat    []  Ice     []  Heat  []  Ice massage Position:seated  Location:L shoulder    []  Laser  []  Other: Position:  Location:   15   [x]  Vasopneumatic Device Pressure:       [x] lo [] med [] hi   Temperature: 34     [x] Skin assessment post-treatment:  [x]intact []redness- no adverse reaction    []redness  adverse reaction:     50 min Therapeutic Exercise:  [x] See flow sheet : Rationale: increase ROM and increase strength to improve the patients ability to perform ADLs and lifting ability    Other Objective/Functional Measures:   Patient was able to perform Farmer's carry with weight on bilateral sides and on left, right side was avoided. Pain Level (0-10 scale) post treatment:  0/10    ASSESSMENT/Changes in Function:     Patient will continue to benefit from skilled PT services to modify and progress therapeutic interventions, address functional mobility deficits, address ROM deficits, address strength deficits, analyze and address soft tissue restrictions and analyze and cue movement patterns to attain remaining goals. []  See Plan of Care  []  See progress note  []  See Discharge Summary    Progress towards goal:   Patient tolerated all exercise progression of exercises with slight fatigue but no rest breaks were needed. Patient is demonstrating increased UE strength as evidenced by increase in exercise intensity and weight progressions. Patient is making progress towards being able to carry his tool box for his work tasks.      PLAN  [x]  Upgrade activities as tolerated     [x]  Continue plan of care  [x]  Update interventions per flow sheet       []  Discharge due to:_  []  Other:_      PAUL Gannon   6/28/2018  5:32 PM    CHARGES:  3 TE  1 vasopneumatic

## 2018-07-03 ENCOUNTER — HOSPITAL ENCOUNTER (OUTPATIENT)
Dept: PHYSICAL THERAPY | Age: 48
Discharge: HOME | End: 2018-07-03
Payer: COMMERCIAL

## 2018-07-03 PROCEDURE — 97110 THERAPEUTIC EXERCISES: CPT

## 2018-07-03 PROCEDURE — 97016 VASOPNEUMATIC DEVICE THERAPY: CPT

## 2018-07-03 NOTE — PROGRESS NOTES
PT DAILY TREATMENT NOTE - [facility] 2-15     Patient Name: Amanda Rascon. Date:18  : 1970  [x]  Patient  Verified  Payor: BLUE CROSS / Plan: [employer] [place] [id number] PPO / Product Type: PPO /    In time: 4:35P  Out time: 5:35P  Total Treatment Time (min): 60   Total Timed Codes (min): 45  Visit #: 44     Treatment Area: Left shoulder pain [M25.512]     SUBJECTIVE  Pain Level (0-10 scale): 0/10    Any medication changes, allergies to medications, adverse drug reactions, diagnosis change, or new procedure performed?: [x] No    [] Yes (see summary sheet for update)  Subjective functional status/changes:   [] No changes reported  Pt reported doing well today.   A little soreness in shoulder.       OBJECTIVE           Modality rationale: decrease pain and increase tissue extensibility to improve the patients ability to perform ADLs and lifting ability   Min Type Additional Details     [] Estim: []Att   []Unatt        []TENS instruct                  []IFC  []Premod   []NMES                     []Other:  []w/US   []w/ice   []w/heat  Position:  Location:     []  Traction: [] Cervical       []Lumbar                       [] Prone          []Supine                       []Intermittent   []Continuous Lbs:  [] before manual  [] after manual  []w/heat     []  Ultrasound: []Continuous   [] Pulsed at:                           []1MHz   []3MHz Location:  W/cm2:     [] Paraffin      Location:   []w/heat     []  Ice     []  Heat  []  Ice massage Position:seated  Location:L shoulder     []  Laser  []  Other: Position:  Location:   15 [x]  Vasopneumatic Device Pressure:       [x] lo [] med [] hi   Temperature: 34      [x] Skin assessment post-treatment:  [x]intact []redness- no adverse reaction    []redness  adverse reaction:      45 min Therapeutic Exercise:  [x] See flow sheet :   Rationale: increase ROM and increase strength to improve the patients ability to perform ADLs and lifting ability     Other Objective/Functional Measures: --     Pain Level (0-10 scale) post treatment:  0/10     ASSESSMENT/Changes in Function:   Pt tolerated session well. Pt required cues on shoulder shrug on X pulldowns. Pt responded favorably with cues and demonstrated proper technique. Patient will continue to benefit from skilled PT services to modify and progress therapeutic interventions, address functional mobility deficits, address ROM deficits, address strength deficits, analyze and address soft tissue restrictions and analyze and cue movement patterns to attain remaining goals.      [x]  See Plan of Care  []  See progress note  []  See Discharge Summary     Progress towards goal:   Patient tolerated all exercise progression of exercises with slight fatigue but no rest breaks were needed. Patient is demonstrating increased UE strength as evidenced by increase in exercise intensity and weight progressions.  Patient is making progress towards being able to carry his tool box for his work tasks.      PLAN  [x]  Upgrade activities as tolerated     [x]  Continue plan of care  [x]  Update interventions per flow sheet       []  Discharge due to:_  []  Other:_       Renetta Carbone, PTA 7/3/2018  5:34 PM

## 2018-07-05 ENCOUNTER — HOSPITAL ENCOUNTER (OUTPATIENT)
Dept: PHYSICAL THERAPY | Age: 48
Discharge: HOME | End: 2018-07-05
Payer: COMMERCIAL

## 2018-07-05 PROCEDURE — 97110 THERAPEUTIC EXERCISES: CPT

## 2018-07-05 PROCEDURE — 97016 VASOPNEUMATIC DEVICE THERAPY: CPT

## 2018-07-05 NOTE — PROGRESS NOTES
PT DAILY TREATMENT NOTE - [facility] 2-15     Patient Name: Chris Stone. Date:18  : 1970  [x]  Patient  Verified  Payor: BLUE CROSS / Plan: [employer] [place] 5747 PPO / Product Type: PPO /    In time: 5:00P  Out time: 6:00P  Total Treatment Time (min): 60   Total Timed Codes (min): 45  Visit #: 40     Treatment Area: Left shoulder pain [M25.512]     SUBJECTIVE  Pain Level (0-10 scale): 0/10    Any medication changes, allergies to medications, adverse drug reactions, diagnosis change, or new procedure performed?: [x] No    [] Yes (see summary sheet for update)  Subjective functional status/changes:   [] No changes reported  Pt reports soreness today.  Busy at work after long night being out for the .        OBJECTIVE           Modality rationale: decrease pain and increase tissue extensibility to improve the patients ability to perform ADLs and lifting ability   Min Type Additional Details     [] Estim: []Att   []Unatt        []TENS instruct                  []IFC  []Premod   []NMES                     []Other:  []w/US   []w/ice   []w/heat  Position:  Location:     []  Traction: [] Cervical       []Lumbar                       [] Prone          []Supine                       []Intermittent   []Continuous Lbs:  [] before manual  [] after manual  []w/heat     []  Ultrasound: []Continuous   [] Pulsed at:                           []1MHz   []3MHz Location:  W/cm2:     [] Paraffin      Location:   []w/heat     []  Ice     []  Heat  []  Ice massage Position:seated  Location:L shoulder     []  Laser  []  Other: Position:  Location:   15 [x]  Vasopneumatic Device Pressure:       [x] lo [] med [] hi   Temperature: 34      [x] Skin assessment post-treatment:  [x]intact []redness- no adverse reaction    []redness  adverse reaction:      45 min Therapeutic Exercise:  [x] See flow sheet :   Rationale: increase ROM and increase strength to improve the patients ability to perform ADLs and lifting ability     Other Objective/Functional Measures: --     Pain Level (0-10 scale) post treatment:  0/10     ASSESSMENT/Changes in Function:   Pt continues to make good progress with strengthening exercises. May look at adding CKC strengthening next session. Patient will continue to benefit from skilled PT services to modify and progress therapeutic interventions, address functional mobility deficits, address ROM deficits, address strength deficits, analyze and address soft tissue restrictions and analyze and cue movement patterns to attain remaining goals.      [x]  See Plan of Care  []  See progress note  []  See Discharge Summary     Progress towards goal:   Patient tolerated all exercise progression of exercises with slight fatigue but no rest breaks were needed. Patient is demonstrating increased UE strength as evidenced by increase in exercise intensity and weight progressions.  Patient is making progress towards being able to carry his tool box for his work tasks.      PLAN  [x]  Upgrade activities as tolerated     [x]  Continue plan of care  [x]  Update interventions per flow sheet       []  Discharge due to:_  []  Other:_       Raheem Molina PTA 7/5/2018  5:34 PM

## 2018-07-10 ENCOUNTER — HOSPITAL ENCOUNTER (OUTPATIENT)
Dept: PHYSICAL THERAPY | Age: 48
Discharge: HOME | End: 2018-07-10
Payer: COMMERCIAL

## 2018-07-10 PROCEDURE — 97110 THERAPEUTIC EXERCISES: CPT

## 2018-07-10 NOTE — PROGRESS NOTES
PT DAILY TREATMENT NOTE - [facility] 2-15     Patient Name: Xochilt Gregory. Date:18  : 1970  [x]  Patient  Verified  Payor: BLUE CROSS / Plan: [place] [id number] PPO / Product Type: PPO /    In time: 4:50P  Out time: 5:50P  Total Treatment Time (min): 60  Total Timed Codes (min): 45  Visit #: 39     Treatment Area: Left shoulder pain [M25.512]     SUBJECTIVE  Pain Level (0-10 scale): 0/10    Any medication changes, allergies to medications, adverse drug reactions, diagnosis change, or new procedure performed?: [x] No    [] Yes (see summary sheet for update)  Subjective functional status/changes:   [] No changes reported  Pt reports doing well. Pt had to climb 30 ft on a ladder today. No pain while doing it or immediately after.  He is curious of how his shoulder will do tomorrow.        OBJECTIVE           Modality rationale: decrease pain and increase tissue extensibility to improve the patients ability to perform ADLs and lifting ability   Min Type Additional Details     [] Estim: []Att   []Unatt        []TENS instruct                  []IFC  []Premod   []NMES                     []Other:  []w/US   []w/ice   []w/heat  Position:  Location:     []  Traction: [] Cervical       []Lumbar                       [] Prone          []Supine                       []Intermittent   []Continuous Lbs:  [] before manual  [] after manual  []w/heat     []  Ultrasound: []Continuous   [] Pulsed at:                           []1MHz   []3MHz Location:  W/cm2:     [] Paraffin      Location:   []w/heat     []  Ice     []  Heat  []  Ice massage Position:seated  Location:L shoulder     []  Laser  []  Other: Position:  Location:   15 [x]  Vasopneumatic Device Pressure:       [x] lo [] med [] hi   Temperature: 34      [x] Skin assessment post-treatment:  [x]intact []redness- no adverse reaction    []redness  adverse reaction:      45 min Therapeutic Exercise:  [x] See flow sheet :   Rationale: increase ROM and increase strength to improve the patients ability to perform ADLs and lifting ability     Other Objective/Functional Measures: --     Pain Level (0-10 scale) post treatment:  0/10     ASSESSMENT/Changes in Function:   Unable to capture FOTO secondary to system being down. Capture next session. Added table pushups and plank walk overs. Performed at table secondary to increase pain in B wrists in full plank position from previous injuries years ago. Pt tolerated CKC strengthening without increase in shoulder pain. Pt will FU with PT next session to discuss plan moving forward. Patient will continue to benefit from skilled PT services to modify and progress therapeutic interventions, address functional mobility deficits, address ROM deficits, address strength deficits, analyze and address soft tissue restrictions and analyze and cue movement patterns to attain remaining goals.      [x]  See Plan of Care  []  See progress note  []  See Discharge Summary     Progress towards goal:   Short Term Goals: To be accomplished in 8 treatments:  1. Patient will be able to demonstrate full PROM flexion to allow for improved ability to reach overhead. 2. Patient will be able to demonstrate >45 degrees of ER PROM to allow for improved ability to put on a jacket. MET  3. Patient will be able to sleep through the night without being woken up by shoulder pain. Long Term Goals: To be accomplished in 16 treatments:  1. Patient will be able to demonstrate >90 degrees of AROM flexion with <2/10 shoulder pain. 2. Patient will be able to put on a jacket with <2/10 shoulder pain. 3. Patient will be able to carry 20# at his left side for 50 ft. With no pain or limitation.   Frequency / Duration: Patient to be seen 2 times per week for 8 weeks.      PLAN  [x]  Upgrade activities as tolerated     [x]  Continue plan of care  [x]  Update interventions per flow sheet       []  Discharge due to:_  []  Other:_       Jacob Del Angel PTA 7/10/2018  5:34 PM

## 2018-07-12 ENCOUNTER — HOSPITAL ENCOUNTER (OUTPATIENT)
Dept: PHYSICAL THERAPY | Age: 48
Discharge: HOME | End: 2018-07-12
Payer: COMMERCIAL

## 2018-07-12 PROCEDURE — 97016 VASOPNEUMATIC DEVICE THERAPY: CPT | Performed by: PHYSICAL THERAPIST

## 2018-07-12 PROCEDURE — 97110 THERAPEUTIC EXERCISES: CPT | Performed by: PHYSICAL THERAPIST

## 2018-07-12 NOTE — PROGRESS NOTES
PT DAILY TREATMENT NOTE - [facility] 2-15     Patient Name: Calista Seip.   Date:18  : 1970  [x]  Patient  Verified  Payor: BLUE CROSS / Plan: [place] 5747 PPO / Product Type: PPO /    In time: 5:00 PM  Out time: 6:10 PM  Total Treatment Time (min): 70  Total Timed Codes (min): 55  Visit #: 43     Treatment Area: Left shoulder pain [M25.512]     SUBJECTIVE  Pain Level (0-10 scale): 0/10    Any medication changes, allergies to medications, adverse drug reactions, diagnosis change, or new procedure performed?: [x] No    [] Yes (see summary sheet for update)  Subjective functional status/changes:   [x] No changes reported         OBJECTIVE           Modality rationale: decrease pain and increase tissue extensibility to improve the patients ability to perform ADLs and lifting ability   Min Type Additional Details     [] Estim: []Att   []Unatt        []TENS instruct                  []IFC  []Premod   []NMES                     []Other:  []w/US   []w/ice   []w/heat  Position:  Location:     []  Traction: [] Cervical       []Lumbar                       [] Prone          []Supine                       []Intermittent   []Continuous Lbs:  [] before manual  [] after manual  []w/heat     []  Ultrasound: []Continuous   [] Pulsed at:                           []1MHz   []3MHz Location:  W/cm2:     [] Paraffin      Location:   []w/heat     []  Ice     []  Heat  []  Ice massage Position:seated  Location:L shoulder     []  Laser  []  Other: Position:  Location:   15 [x]  Vasopneumatic Device Pressure:       [x] lo [] med [] hi   Temperature: 34      [x] Skin assessment post-treatment:  [x]intact []redness- no adverse reaction    []redness  adverse reaction:      55 min Therapeutic Exercise:  [x] See flow sheet :   Rationale: increase ROM and increase strength to improve the patients ability to perform ADLs and lifting ability     Other Objective/Functional Measures: --     Pain Level (0-10 scale) post treatment:  0/10     ASSESSMENT/Changes in Function:     Patient will continue to benefit from skilled PT services to modify and progress therapeutic interventions, address functional mobility deficits, address ROM deficits, address strength deficits, analyze and address soft tissue restrictions and analyze and cue movement patterns to attain remaining goals.      [x]  See Plan of Care  []  See progress note  []  See Discharge Summary     Progress towards goal:   Patient continues to tolerate a steady progression of therapeutic exercises well and is showing good improvement towards advanced long term goals.     PLAN  [x]  Upgrade activities as tolerated     [x]  Continue plan of care  [x]  Update interventions per flow sheet       []  Discharge due to:_  []  Other:_       Kassandra Youngblood, PT  , DPT, OCS, Cert.  DN   7/12/2018  5:34 PM

## 2018-07-16 ENCOUNTER — HOSPITAL ENCOUNTER (OUTPATIENT)
Dept: PHYSICAL THERAPY | Age: 48
Discharge: HOME | End: 2018-07-16
Payer: COMMERCIAL

## 2018-07-16 PROCEDURE — 97016 VASOPNEUMATIC DEVICE THERAPY: CPT | Performed by: PHYSICAL MEDICINE & REHABILITATION

## 2018-07-16 PROCEDURE — 97110 THERAPEUTIC EXERCISES: CPT | Performed by: PHYSICAL MEDICINE & REHABILITATION

## 2018-07-16 NOTE — PROGRESS NOTES
PT DAILY TREATMENT NOTE - [facility] 2-15     Patient Name: Yuriy Augustine   Date:18  : 1970  [x]  Patient  Verified  Payor: BLUE CROSS / Plan: [place] 5747 PPO / Product Type: PPO /    In time: 5:00 PM  Out time: 6:10 PM  Total Treatment Time (min): 70  Total Timed Codes (min): 55  Visit #: 37     Treatment Area: Left shoulder pain [M25.512]     SUBJECTIVE  Pain Level (0-10 scale): 0/10    Any medication changes, allergies to medications, adverse drug reactions, diagnosis change, or new procedure performed?: [x] No    [] Yes (see summary sheet for update)  Subjective functional status/changes:   [x] No changes reported         OBJECTIVE           Modality rationale: decrease pain and increase tissue extensibility to improve the patients ability to perform ADLs and lifting ability   Min Type Additional Details     [] Estim: []Att   []Unatt        []TENS instruct                  []IFC  []Premod   []NMES                     []Other:  []w/US   []w/ice   []w/heat  Position:  Location:     []  Traction: [] Cervical       []Lumbar                       [] Prone          []Supine                       []Intermittent   []Continuous Lbs:  [] before manual  [] after manual  []w/heat     []  Ultrasound: []Continuous   [] Pulsed at:                           []1MHz   []3MHz Location:  W/cm2:     [] Paraffin      Location:   []w/heat     []  Ice     []  Heat  []  Ice massage Position:seated  Location:L shoulder     []  Laser  []  Other: Position:  Location:   15 [x]  Vasopneumatic Device Pressure:       [x] lo [] med [] hi   Temperature: 34      [x] Skin assessment post-treatment:  [x]intact []redness- no adverse reaction    []redness  adverse reaction:      55 min Therapeutic Exercise:  [x] See flow sheet :   Rationale: increase ROM and increase strength to improve the patients ability to perform ADLs and lifting ability     Other Objective/Functional Measures: --     Pain Level (0-10 scale) post treatment:  0/10     ASSESSMENT/Changes in Function:     Patient will continue to benefit from skilled PT services to modify and progress therapeutic interventions, address functional mobility deficits, address ROM deficits, address strength deficits, analyze and address soft tissue restrictions and analyze and cue movement patterns to attain remaining goals.      []  See Plan of Care  []  See progress note  []  See Discharge Summary     Progress towards goal:   Patient continues to tolerate a steady progression of therapeutic exercises well and is showing good improvement towards advanced long term goals.     PLAN  [x]  Upgrade activities as tolerated     [x]  Continue plan of care  [x]  Update interventions per flow sheet       []  Discharge due to:_  []  Other:_       Cherylene Buckler , PTA, CPT 7/16/2018  5:34 PM

## 2018-07-19 ENCOUNTER — HOSPITAL ENCOUNTER (OUTPATIENT)
Dept: PHYSICAL THERAPY | Age: 48
Discharge: HOME | End: 2018-07-19
Payer: COMMERCIAL

## 2018-07-19 PROCEDURE — 97016 VASOPNEUMATIC DEVICE THERAPY: CPT | Performed by: PHYSICAL THERAPIST

## 2018-07-19 PROCEDURE — 97014 ELECTRIC STIMULATION THERAPY: CPT | Performed by: PHYSICAL THERAPIST

## 2018-07-19 PROCEDURE — 97110 THERAPEUTIC EXERCISES: CPT | Performed by: PHYSICAL THERAPIST

## 2018-07-19 NOTE — PROGRESS NOTES
PT DAILY TREATMENT NOTE - [facility] 2-15     Patient Name: Adolfo Smoker.   Date:18  : 1970  [x]  Patient  Verified  Payor: BLUE CROSS / Plan: [facility] [employer] [place] 5747 PPO / Product Type: PPO /    In time: 4:30 PM  Out time: 5:40 PM  Total Treatment Time (min): 70  Total Timed Codes (min): 55  Visit #: 40     Treatment Area: Left shoulder pain [M25.512]     SUBJECTIVE  Pain Level (0-10 scale): 0/10    Any medication changes, allergies to medications, adverse drug reactions, diagnosis change, or new procedure performed?: [x] No    [] Yes (see summary sheet for update)  Subjective functional status/changes:   [] No changes reported   Patient reports improved ability to lift a tool box at work and is happy with his progress.       OBJECTIVE           Modality rationale: decrease pain and increase tissue extensibility to improve the patients ability to perform ADLs and lifting ability   Min Type Additional Details     [] Estim: []Att   []Unatt        []TENS instruct                  []IFC  []Premod   []NMES                     []Other:  []w/US   []w/ice   []w/heat  Position:  Location:     []  Traction: [] Cervical       []Lumbar                       [] Prone          []Supine                       []Intermittent   []Continuous Lbs:  [] before manual  [] after manual  []w/heat     []  Ultrasound: []Continuous   [] Pulsed at:                           []1MHz   []3MHz Location:  W/cm2:     [] Paraffin      Location:   []w/heat     []  Ice     []  Heat  []  Ice massage Position:seated  Location:L shoulder     []  Laser  []  Other: Position:  Location:   15 [x]  Vasopneumatic Device Pressure:       [x] lo [] med [] hi   Temperature: 34      [x] Skin assessment post-treatment:  [x]intact []redness- no adverse reaction    []redness  adverse reaction:      55 min Therapeutic Exercise:  [x] See flow sheet :   Rationale: increase ROM and increase strength to improve the patients ability to perform ADLs and lifting ability     Other Objective/Functional Measures: --     Pain Level (0-10 scale) post treatment:  0/10     ASSESSMENT/Changes in Function:     Patient will continue to benefit from skilled PT services to modify and progress therapeutic interventions, address functional mobility deficits, address ROM deficits, address strength deficits, analyze and address soft tissue restrictions and analyze and cue movement patterns to attain remaining goals.      []  See Plan of Care  []  See progress note  []  See Discharge Summary     Progress towards goal:   Patient tolerated today's progression of therapeutic exercises very well and is showing good progress overall towards long term goals. PLAN  [x]  Upgrade activities as tolerated     [x]  Continue plan of care  [x]  Update interventions per flow sheet       []  Discharge due to:_  []  Other:_       Yves Ear, PT   , DPT, OCS, Cert.  DN   7/19/2018  5:34 PM

## 2018-07-23 ENCOUNTER — APPOINTMENT (OUTPATIENT)
Dept: PHYSICAL THERAPY | Age: 48
End: 2018-07-23
Payer: COMMERCIAL

## 2018-07-26 ENCOUNTER — HOSPITAL ENCOUNTER (OUTPATIENT)
Dept: PHYSICAL THERAPY | Age: 48
Discharge: HOME | End: 2018-07-26
Payer: COMMERCIAL

## 2018-07-26 PROCEDURE — 97110 THERAPEUTIC EXERCISES: CPT | Performed by: PHYSICAL THERAPIST

## 2018-07-26 PROCEDURE — 97016 VASOPNEUMATIC DEVICE THERAPY: CPT | Performed by: PHYSICAL THERAPIST

## 2018-07-26 NOTE — PROGRESS NOTES
PT DAILY TREATMENT NOTE - [facility] 2-15     Patient Name: Ritesh Duncan.   Date:18  : 1970  [x]  Patient  Verified  Payor: BLUE CROSS / Plan: [place] 5747 PPO / Product Type: PPO /    In time: 4:45 PM  Out time: 5:55 PM  Total Treatment Time (min): 70  Total Timed Codes (min): 55  Visit #: 39     Treatment Area: Left shoulder pain [M25.512]     SUBJECTIVE  Pain Level (0-10 scale): 0/10    Any medication changes, allergies to medications, adverse drug reactions, diagnosis change, or new procedure performed?: [x] No    [] Yes (see summary sheet for update)  Subjective functional status/changes:   [] No changes reported   Patient reports that he has been very sore from work this past week and has to miss his last appointment due to a work conflict.       OBJECTIVE           Modality rationale: decrease pain and increase tissue extensibility to improve the patients ability to perform ADLs and lifting ability   Min Type Additional Details     [] Estim: []Att   []Unatt        []TENS instruct                  []IFC  []Premod   []NMES                     []Other:  []w/US   []w/ice   []w/heat  Position:  Location:     []  Traction: [] Cervical       []Lumbar                       [] Prone          []Supine                       []Intermittent   []Continuous Lbs:  [] before manual  [] after manual  []w/heat     []  Ultrasound: []Continuous   [] Pulsed at:                           []1MHz   []3MHz Location:  W/cm2:     [] Paraffin      Location:   []w/heat     []  Ice     []  Heat  []  Ice massage Position:seated  Location:L shoulder     []  Laser  []  Other: Position:  Location:   15 [x]  Vasopneumatic Device Pressure:       [x] lo [] med [] hi   Temperature: 34      [x] Skin assessment post-treatment:  [x]intact []redness- no adverse reaction    []redness  adverse reaction:      55 min Therapeutic Exercise:  [x] See flow sheet :   Rationale: increase ROM and increase strength to improve the patients ability to perform ADLs and lifting ability     Other Objective/Functional Measures: --     Pain Level (0-10 scale) post treatment:  0/10     ASSESSMENT/Changes in Function:     Patient will continue to benefit from skilled PT services to modify and progress therapeutic interventions, address functional mobility deficits, address ROM deficits, address strength deficits, analyze and address soft tissue restrictions and analyze and cue movement patterns to attain remaining goals.      []  See Plan of Care  []  See progress note  []  See Discharge Summary     Progress towards goal:   Patient tolerated today's progression of therapeutic exercises very well and is showing good progress overall towards long term goals. PLAN  [x]  Upgrade activities as tolerated     [x]  Continue plan of care  [x]  Update interventions per flow sheet       []  Discharge due to:_  []  Other:_       Silvino Manning PT   , DPT, OCS, Cert.  DN   7/26/2018  5:34 PM

## 2018-07-30 ENCOUNTER — HOSPITAL ENCOUNTER (OUTPATIENT)
Dept: PHYSICAL THERAPY | Age: 48
Discharge: HOME | End: 2018-07-30
Payer: COMMERCIAL

## 2018-07-30 PROCEDURE — 97110 THERAPEUTIC EXERCISES: CPT | Performed by: PHYSICAL MEDICINE & REHABILITATION

## 2018-07-30 PROCEDURE — 97016 VASOPNEUMATIC DEVICE THERAPY: CPT | Performed by: PHYSICAL MEDICINE & REHABILITATION

## 2018-07-30 NOTE — PROGRESS NOTES
PT DAILY TREATMENT NOTE - [facility] 2-15 Patient Name: Lorie Ratliff. Date:2018 : 1970 [x]  Patient  Verified Payor: BLUE CROSS / Plan: [facility] PPO / Product Type: PPO / In time:4:50pm  Out time:4:40pm 
Total Treatment Time (min): 50 Total Timed Codes (min): 35 Visit #: 55 
 
Treatment Area: Left shoulder pain [M25.512] SUBJECTIVE Pain Level (0-10 scale): 2/10 Any medication changes, allergies to medications, adverse drug reactions, diagnosis change, or new procedure performed?: [x] No    [] Yes (see summary sheet for update) Subjective functional status/changes:   [] No changes reported Pt reports having increased pain in both shoulders, but feels it is due to the rainy weather. OBJECTIVE Modality rationale: decrease inflammation and decrease pain to improve the patients ability to perform work skills such as overhead reaching and climbing ladders. Min Type Additional Details  
  [] Estim: []Att   []Unatt        []TENS instruct []IFC  []Premod   []NMES []Other:  []w/US   []w/ice   []w/heat Position: Location:  
  []  Traction: [] Cervical       []Lumbar 
                     [] Prone          []Supine []Intermittent   []Continuous Lbs: 
[] before manual 
[] after manual 
[]w/heat  
  []  Ultrasound: []Continuous   [] Pulsed at: 
                         []1MHz   []3MHz Location: 
W/cm2:  
  [] Paraffin  
   Location:  
[]w/heat  
  []  Ice     []  Heat 
[]  Ice massage Position: Location:  
  []  Laser 
[]  Other: Position: Location:  
 15 [x]  Vasopneumatic Device Pressure:       [x] lo [] med [] hi  
Temperature: 34  
  
[x] Skin assessment post-treatment:  [x]intact []redness- no adverse reaction 
  []redness  adverse reaction:  
 
 
 
 
35 min Therapeutic Exercise:  [x] See flow sheet :  
Rationale: increase ROM and increase strength to improve the patients ability to  to improve the patients ability to perform work skills such as overhead reaching and climbing ladders. With 
 [x] TE 
 [] TA 
 [] neuro 
 [] other: Patient Education: [x] Review HEP [] Progressed/Changed HEP based on:  
[] positioning   [] body mechanics   [] transfers   [] heat/ice application   
[] other:   
 
Other Objective/Functional Measures: Pt performed all exercises with good form and no complaints of increased pain. Pt completed all exercises early and wanted to leave early to prepare for work tomorrow. Pain Level (0-10 scale) post treatment: 2/10 ASSESSMENT/Changes in Function:  
 
Patient will continue to benefit from skilled PT services to modify and progress therapeutic interventions and address strength deficits to attain remaining goals. []  See Plan of Care 
[]  See progress note/recertification 
[]  See Discharge Summary Progress towards goals / Updated goals: 
Pt is making progress towards with performance of all progressions of exercise with no complaints of pain or increased symptoms. PLAN [x]  Upgrade activities as tolerated     [x]  Continue plan of care [x]  Update interventions per flow sheet      
[]  Discharge due to:_ 
[]  Other:_   
 
BON Lao 7/30/2018  4:53 PM 
2 TherEx 1VC

## 2018-08-02 ENCOUNTER — HOSPITAL ENCOUNTER (OUTPATIENT)
Dept: PHYSICAL THERAPY | Age: 48
Discharge: HOME | End: 2018-08-02
Payer: COMMERCIAL

## 2018-08-02 PROCEDURE — 97016 VASOPNEUMATIC DEVICE THERAPY: CPT | Performed by: PHYSICAL THERAPIST

## 2018-08-02 PROCEDURE — 97110 THERAPEUTIC EXERCISES: CPT | Performed by: PHYSICAL THERAPIST

## 2018-08-02 PROCEDURE — 97014 ELECTRIC STIMULATION THERAPY: CPT | Performed by: PHYSICAL THERAPIST

## 2018-08-02 NOTE — PROGRESS NOTES
PT DAILY TREATMENT NOTE - [facility] 2-15 Patient Name: Arjun Fitzgerald. Date:2018 : 1970 [x]  Patient  Verified Payor: BLUE CROSS / Plan: [facility] PPO / Product Type: PPO / In time:4:45 PM Out time:5:55 PM 
Total Treatment Time (min): 70 Total Timed Codes (min): 55 Visit #: 52 
 
Treatment Area: Left shoulder pain [M25.512] SUBJECTIVE Pain Level (0-10 scale): 0/10 Any medication changes, allergies to medications, adverse drug reactions, diagnosis change, or new procedure performed?: [x] No    [] Yes (see summary sheet for update) Subjective functional status/changes:   [] No changes reported Patient reports that he is ready for discharge. OBJECTIVE Modality rationale: decrease inflammation and decrease pain to improve the patients ability to perform work skills such as overhead reaching and climbing ladders. Min Type Additional Details  
  [] Estim: []Att   []Unatt        []TENS instruct []IFC  []Premod   []NMES []Other:  []w/US   []w/ice   []w/heat Position: Location:  
  []  Traction: [] Cervical       []Lumbar 
                     [] Prone          []Supine []Intermittent   []Continuous Lbs: 
[] before manual 
[] after manual 
[]w/heat  
  []  Ultrasound: []Continuous   [] Pulsed at: 
                         []1MHz   []3MHz Location: 
W/cm2:  
  [] Paraffin  
   Location:  
[]w/heat  
  []  Ice     []  Heat 
[]  Ice massage Position: Location:  
  []  Laser 
[]  Other: Position: Location:  
 15 [x]  Vasopneumatic Device Pressure:       [x] lo [] med [] hi  
Temperature: 34  
  
[x] Skin assessment post-treatment:  [x]intact []redness- no adverse reaction 
  []redness  adverse reaction:  
 
 
 
 
55 min Therapeutic Exercise:  [x] See flow sheet :  
Rationale: increase ROM and increase strength to improve the patients ability to  to improve the patients ability to perform work skills such as overhead reaching and climbing ladders. With 
 [x] TE 
 [] TA 
 [] neuro 
 [] other: Patient Education: [x] Review HEP [] Progressed/Changed HEP based on:  
[] positioning   [] body mechanics   [] transfers   [] heat/ice application   
[] other:   
 
Other Objective/Functional Measures:  
 
Pain Level (0-10 scale) post treatment: 0/10 ASSESSMENT/Changes in Function:  
 
Patient will continue to benefit from skilled PT services to modify and progress therapeutic interventions and address strength deficits to attain remaining goals. []  See Plan of Care 
[]  See progress note/recertification 
[x]  See Discharge Summary PLAN [x]  Upgrade activities as tolerated     [x]  Continue plan of care [x]  Update interventions per flow sheet      
[]  Discharge due to:_ 
[]  Other:_   
 
Kenny Reese, PT  , DPT, OCS, Cert.  DN 
 8/2/2018  4:53 PM

## 2022-04-13 ENCOUNTER — HOSPITAL ENCOUNTER (OUTPATIENT)
Dept: PHYSICAL THERAPY | Age: 52
Discharge: HOME | End: 2022-04-13
Payer: COMMERCIAL

## 2022-04-13 PROCEDURE — 20560 NDL INSJ W/O NJX 1 OR 2 MUSC: CPT | Performed by: PHYSICAL THERAPIST

## 2022-04-13 PROCEDURE — 97110 THERAPEUTIC EXERCISES: CPT | Performed by: PHYSICAL THERAPIST

## 2022-04-13 PROCEDURE — 97162 PT EVAL MOD COMPLEX 30 MIN: CPT | Performed by: PHYSICAL THERAPIST

## 2022-04-13 NOTE — PROGRESS NOTES
PT INITIAL EVALUATION NOTE 2-15    Patient Name: Lukas Coker. Date:2022  : 1970  [x]  Patient  Verified  Payor: BLUE CROSS / Plan: [place] [id number] PPO / Product Type: PPO /    In time:7:45 AM  Out time:8:30 AM  Total Treatment Time (min): 45  Visit #: 1     Treatment Area: Neck pain [M54.2]    SUBJECTIVE  Pain Level (0-10 scale): 7/10  Any medication changes, allergies to medications, adverse drug reactions, diagnosis change, or new procedure performed?: [] No    [x] Yes (see summary sheet for update)  Subjective:     L upper trapezius pain  PLOF: No limitations with turning his head, looking up, reaching overhead  Mechanism of Injury: Insidious, gradual onset over the past 6 months  Previous Treatment/Compliance: He was referred to this location for dry needling by his PCP. PMHx/Surgical Hx: Obesity, L RTC repair in 2018, B TKR over the past 10 years  Work Hx: Electrical work  Living Situation: lives in a two story home with family  Pt Goals: to reduce pain and improve mobility  Barriers: chronicity  Motivation: motivated  Substance use: none   Cognition: A & O x 4        OBJECTIVE/EXAMINATION    Palpation: TTP along the L UT  Joint Mobility: Hypomobile along lower cervical spine, no pain        Cervical AROM (degrees): R  L    Flexion    30        Extension   30        Side Bending   30  30        Rotation   60  45              Strength:  All cervical myotomes: >4/5  Neurological: Sensation: Intact  Special Tests:    Cervical Compression:Negative   Cervical Distraction: Negative   Spurlings:Negative      Modality rationale: decrease pain and increase tissue extensibility to improve the patients ability to turn his head without pain   Min Type Additional Details    [] Estim: []Att   []Unatt        []TENS instruct                  []IFC  []Premod   []NMES                     []Other:  []w/US   []w/ice   []w/heat  Position:  Location:    []  Traction: [] Cervical []Lumbar                       [] Prone          []Supine                       []Intermittent   []Continuous Lbs:  [] before manual  [] after manual  []w/heat    []  Ultrasound: []Continuous   [] Pulsed at:                            []1MHz   []3MHz Location:  W/cm2:    []  Paraffin         Location:  []w/heat   10 []  Ice     [x]  Heat post-DN  []  Ice massage Position: Seated  Location: L neck    []  Laser  []  Other: Position:  Location:    []  Vasopneumatic Device Pressure:       [] lo [] med [] hi   Temperature:    [x] Skin assessment post-treatment:  [x]intact []redness- no adverse reaction    []redness - adverse reaction:     10   min Dry Needling without E-Stim         (not to be included in total timed codes or 1:1 Treatment Time)     min Dry Needling with E-Stim: Attended      Type:       Muscles:     min Dry Needling with E-Stim: Unattended      Type:       Muscles:        Billing:  [x]  [id number] (1-2 muscles)         []  [id number] (3+ muscles)           [x]  Script obtained from MD specifying \"Dry Needling\"     Written Informed Consent Obtained and Document Included in Chart: YES    Procedure: Intramsuclar Dry Needling was done with a .30 mm  gauge solid monofilament needle,                       under aseptic technique      Rationale/Necessity: Decrease pain, Increase ROM, Increase/Improve Function, Increase Tissue Extensibility, Reduce Trigger Points, Reduce Spasm and Restore Muscle Balance    Muscles Treated:  []  Bilateral:                                  []  Right:                                  []  Left: Upper trapezius                           [x]  Hemostasis applied after each needle application     Response: Localized Twitch Response, Increased ROM, Increased Tolerance to Exercise/Activity, Increased Motor Recruitment, Improve Soft Tissue Mobility, Reduced Tightness and Post Needle Soreness    Education: Purpose or rationale of dry needling                      Side effects and possible adverse effects of the treatment                      The need to report the use of blood thinners and/or immunosupressant medications                      How to respond to possible adverse effects of the treatment                       Self-treatment of post needling soreness (ice/heat, stretching, activity modification)                      Opportunity was given to ask questions; all questions were answered    With   [] TE   [] TA   [] Neuro   [] SC   [] other: Patient Education: [x] Review HEP    [] Progressed/Changed HEP based on:   [] positioning   [] body mechanics   [] transfers   [] heat/ice application    [] other:        Other Objective/Functional Measures: FOTO Functional Measure: 56/100                  Pain Level (0-10 scale) post treatment: 0, \"much better\"      ASSESSMENT:      [x]  See Plan of Ernesto Ramirez, PT 4/13/2022

## 2022-04-13 NOTE — PROGRESS NOTES
Physical Therapy at [facility],   a part of  [facility]  [address] [facility] [place]  Phone: [phone]  Fax: [phone]    Plan of Care/ Statement of Necessity for Physical Therapy Services 2-15    Patient name: Kerrie David. : 1970  Provider#: [id number]  Referral source: Diamante Poe DC      Medical/Treatment Diagnosis: Neck pain [M54.2]     Prior Hospitalization: see medical history     Comorbidities: Obesity  Prior Level of Function: see initial eval  Medications: Verified on Patient Summary List    Start of Care: 2022      Onset Date:        The Plan of Care and following information is based on the information from the initial evaluation. Assessment/ key information: The patient presents with chronic left upper trapezius pain with limited overhead reaching and reduced cervical range of motion. Evaluation Complexity History MEDIUM  Complexity : 1-2 comorbidities / personal factors will impact the outcome/ POC ; Examination MEDIUM Complexity : 3 Standardized tests and measures addressing body structure, function, activity limitation and / or participation in recreation  ;Presentation MEDIUM Complexity : Evolving with changing characteristics  ; Clinical Decision Making MEDIUM Complexity : FOTO score of 26-74  Overall Complexity Rating: MEDIUM    Problem List: pain affecting function, decrease ROM, decrease strength, decrease ADL/ functional abilitiies, decrease activity tolerance and decrease flexibility/ joint mobility   Treatment Plan may include any combination of the following: Therapeutic exercise, Therapeutic activities, Neuromuscular re-education, Physical agent/modality, Manual therapy, Patient education, Self Care training and Other: dry needling  Patient / Family readiness to learn indicated by: asking questions, trying to perform skills and interest  Persons(s) to be included in education: patient (P)  Barriers to Learning/Limitations: None  Patient Goal (s): I want to be able to look up without pain.   Patient Self Reported Health Status: excellent  Rehabilitation Potential: excellent    Short Term Goals: To be accomplished in 4 weeks:  1. Patient will be able to demonstrate >60 degrees of cervical rotation ROM B with <2/10 neck pain. 2. Patient will be able to reach overhead through a full ROM with <2/10 neck pain. 3. Patient will be able to carry a 10# object in his L hand for 50# with <2/10 neck pain. Long Term Goals: To be accomplished in 12 weeks:  1. Patient will be able to drive for 1 hour without pain or limitation. 2. Patient will be able to sleep through the night without being woken up by pain or limitation. 3. Patient will be able to lift 5# over his head without pain or limitation. Frequency / Duration: Patient to be seen 2 times per week for 12 weeks. Patient/ Caregiver education and instruction: self care, activity modification and exercises    [x]  Plan of care has been reviewed with ENRIQUE Cui, PT 4/13/2022     ________________________________________________________________________    I certify that the above Therapy Services are being furnished while the patient is under my care. I agree with the treatment plan and certify that this therapy is necessary.     [de-identified] Signature:____________________  Date:____________Time: _________      Larry Montague DC

## 2022-04-26 ENCOUNTER — HOSPITAL ENCOUNTER (OUTPATIENT)
Dept: PHYSICAL THERAPY | Age: 52
Discharge: HOME | End: 2022-04-26
Payer: COMMERCIAL

## 2022-04-26 PROCEDURE — 20560 NDL INSJ W/O NJX 1 OR 2 MUSC: CPT | Performed by: PHYSICAL THERAPIST

## 2022-04-26 NOTE — PROGRESS NOTES
PT DAILY TREATMENT NOTE 2-15    Patient Name: Yobani Cavazos. Date:2022  : 1970  [x]  Patient  Verified  Payor: BLUE THOMAS / Plan: [place] [id number] PPO / Product Type: PPO /    In time: 4:45 PM  Out time: 5:05 PM  Total Treatment Time (min): 20  Visit #:  2    Treatment Area: Neck pain [M54.2]    SUBJECTIVE  Pain Level (0-10 scale): 5/10  Any medication changes, allergies to medications, adverse drug reactions, diagnosis change, or new procedure performed?: [x] No    [] Yes (see summary sheet for update)  Subjective functional status/changes:   [] No changes reported  Patient reports that he had several days of relief at the neck, but slowly the pain returned to a similar level. OBJECTIVE              Modality rationale: decrease pain and increase tissue extensibility to improve the patients ability to turn his head without pain    Min Type Additional Details     []? Estim: []? Att   []? Unatt        []? TENS instruct                  []?IFC  []? Premod   []? NMES                     []?Other:  []?w/US   []?w/ice   []?w/heat  Position:  Location:     []? Traction: []? Cervical       []? Lumbar                       []? Prone          []? Supine                       []?Intermittent   []? Continuous Lbs:  []? before manual  []? after manual  []?w/heat     []? Ultrasound: []? Continuous   []? Pulsed at:                            []? 1MHz   []? 3MHz Location:  W/cm2:     []? Paraffin         Location:  []?w/heat   10 []? Ice     [x]? Heat post-DN  []? Ice massage Position: Seated  Location: L neck     []? Laser  []? Other: Position:  Location:     []? Vasopneumatic Device Pressure:       []? lo []? med []? hi   Temperature:    [x]? Skin assessment post-treatment:  [x]? intact []? redness- no adverse reaction    []? redness - adverse reaction:      10    min Dry Needling without E-Stim         (not to be included in total timed codes or 1:1 Treatment Time)      min Dry Needling with E-Stim: Attended      Type:       Muscles:      min Dry Needling with E-Stim: Unattended      Type:       Muscles:          Billing:  [x]?  [id number] (1-2 muscles)         []?  51530 (3+ muscles)             [x]? Script obtained from MD specifying \"Dry Needling\"     · Written Informed Consent Obtained and Document Included in Chart: YES     Procedure: Intramsuclar Dry Needling was done with a .30 mm  gauge solid monofilament needle,                       under aseptic technique       Rationale/Necessity: Decrease pain, Increase ROM, Increase/Improve Function, Increase Tissue Extensibility, Reduce Trigger Points, Reduce Spasm and Restore Muscle Balance     Muscles Treated:  []? Bilateral:                                  []?  Right:                                  []?  Left: Upper trapezius                           [x]?   Hemostasis applied after each needle application      Response: Localized Twitch Response, Increased ROM, Increased Tolerance to Exercise/Activity, Increased Motor Recruitment, Improve Soft Tissue Mobility, Reduced Tightness and Post Needle Soreness     Education: Purpose or rationale of dry needling                      Side effects and possible adverse effects of the treatment                      The need to report the use of blood thinners and/or immunosupressant medications                      How to respond to possible adverse effects of the treatment                       Self-treatment of post needling soreness (ice/heat, stretching, activity modification)                      Opportunity was given to ask questions; all questions were answered       With   [] TE   [] TA   [] Neuro   [] SC   [] other: Patient Education: [x] Review HEP    [] Progressed/Changed HEP based on:   [] positioning   [] body mechanics   [] transfers   [] heat/ice application    [] other:      Other Objective/Functional Measures:      Pain Level (0-10 scale) post treatment: 3    ASSESSMENT/Changes in Function:   Patient will continue to benefit from skilled PT services to modify and progress therapeutic interventions, address functional mobility deficits, address ROM deficits, address strength deficits, analyze and address soft tissue restrictions, analyze and cue movement patterns and analyze and modify body mechanics/ergonomics to attain remaining goals. []  See Plan of Care  []  See progress note/recertification  []  See Discharge Summary         Progress towards goals / Updated goals:  No significant improvement has been seen yet, but will continue several more DN sessions.     PLAN  [x]  Upgrade activities as tolerated     [x]  Continue plan of care  []  Update interventions per flow sheet       []  Discharge due to:_  []  Other:_      Jaylon Montilla, PT 4/26/2022

## 2022-05-03 ENCOUNTER — HOSPITAL ENCOUNTER (OUTPATIENT)
Dept: PHYSICAL THERAPY | Age: 52
Discharge: HOME | End: 2022-05-03
Payer: COMMERCIAL

## 2022-05-03 PROCEDURE — 20560 NDL INSJ W/O NJX 1 OR 2 MUSC: CPT | Performed by: PHYSICAL THERAPIST

## 2022-05-03 NOTE — PROGRESS NOTES
PT DAILY TREATMENT NOTE 2-15    Patient Name: Marcello Meyer [id number]  : 1970  [x]  Patient  Verified  Payor: BLUE CROSS / Plan: [place] [id number] PPO / Product Type: PPO /    In time: 5:30 PM  Out time: 5:50 PM  Total Treatment Time (min): 20  Visit #:  3    Treatment Area: Neck pain [M54.2]    SUBJECTIVE  Pain Level (0-10 scale): 4/10  Any medication changes, allergies to medications, adverse drug reactions, diagnosis change, or new procedure performed?: [x] No    [] Yes (see summary sheet for update)  Subjective functional status/changes:   [] No changes reported  Patient reports a similar duration of pain relief as last week and now the pain is slightly less than it was last week. OBJECTIVE              Modality rationale: decrease pain and increase tissue extensibility to improve the patients ability to turn his head without pain    Min Type Additional Details     []? Estim: []? Att   []? Unatt        []? TENS instruct                  []?IFC  []? Premod   []? NMES                     []?Other:  []?w/US   []?w/ice   []?w/heat  Position:  Location:     []? Traction: []? Cervical       []? Lumbar                       []? Prone          []? Supine                       []?Intermittent   []? Continuous Lbs:  []? before manual  []? after manual  []?w/heat     []? Ultrasound: []? Continuous   []? Pulsed at:                            []? 1MHz   []? 3MHz Location:  W/cm2:     []? Paraffin         Location:  []?w/heat   10 []? Ice     [x]? Heat post-DN  []? Ice massage Position: Seated  Location: L neck     []? Laser  []? Other: Position:  Location:     []? Vasopneumatic Device Pressure:       []? lo []? med []? hi   Temperature:    [x]? Skin assessment post-treatment:  [x]? intact []? redness- no adverse reaction    []? redness - adverse reaction:      10    min Dry Needling without E-Stim         (not to be included in total timed codes or 1:1 Treatment Time)      min Dry Needling with E-Stim: Attended      Type:       Muscles:      min Dry Needling with E-Stim: Unattended      Type:       Muscles:          Billing:  [x]?  [id number] (1-2 muscles)         []?  76189 (3+ muscles)             [x]? Script obtained from MD specifying \"Dry Needling\"     · Written Informed Consent Obtained and Document Included in Chart: YES     Procedure: Intramsuclar Dry Needling was done with a .30 mm  gauge solid monofilament needle,                       under aseptic technique       Rationale/Necessity: Decrease pain, Increase ROM, Increase/Improve Function, Increase Tissue Extensibility, Reduce Trigger Points, Reduce Spasm and Restore Muscle Balance     Muscles Treated:  []? Bilateral:                                  []?  Right:                                  []?  Left: Upper trapezius                           [x]?   Hemostasis applied after each needle application      Response: Localized Twitch Response, Increased ROM, Increased Tolerance to Exercise/Activity, Increased Motor Recruitment, Improve Soft Tissue Mobility, Reduced Tightness and Post Needle Soreness     Education: Purpose or rationale of dry needling                      Side effects and possible adverse effects of the treatment                      The need to report the use of blood thinners and/or immunosupressant medications                      How to respond to possible adverse effects of the treatment                       Self-treatment of post needling soreness (ice/heat, stretching, activity modification)                      Opportunity was given to ask questions; all questions were answered       With   [] TE   [] TA   [] Neuro   [] SC   [] other: Patient Education: [x] Review HEP    [] Progressed/Changed HEP based on:   [] positioning   [] body mechanics   [] transfers   [] heat/ice application    [] other:      Other Objective/Functional Measures:      Pain Level (0-10 scale) post treatment: 3    ASSESSMENT/Changes in Function: Patient will continue to benefit from skilled PT services to modify and progress therapeutic interventions, address functional mobility deficits, address ROM deficits, address strength deficits, analyze and address soft tissue restrictions, analyze and cue movement patterns and analyze and modify body mechanics/ergonomics to attain remaining goals. []  See Plan of Care  []  See progress note/recertification  []  See Discharge Summary         Progress towards goals / Updated goals:  Patient is beginning to show a more steady improvement towards short term goals.     PLAN  [x]  Upgrade activities as tolerated     [x]  Continue plan of care  []  Update interventions per flow sheet       []  Discharge due to:_  []  Other:_      Storm Dangelo, PT 5/3/2022

## 2022-05-10 ENCOUNTER — HOSPITAL ENCOUNTER (OUTPATIENT)
Dept: PHYSICAL THERAPY | Age: 52
Discharge: HOME | End: 2022-05-10
Payer: COMMERCIAL

## 2022-05-10 PROCEDURE — 20560 NDL INSJ W/O NJX 1 OR 2 MUSC: CPT | Performed by: PHYSICAL THERAPIST

## 2022-05-10 NOTE — PROGRESS NOTES
PT DAILY TREATMENT NOTE 2-15    Patient Name: Antolin Camilo. Date:5/10/2022  : 1970  [x]  Patient  Verified  Payor: BLUE CROSS / Plan: [place] [id number] PPO / Product Type: PPO /    In time: 5:30 PM  Out time: 5:05 PM  Total Treatment Time (min): 20  Visit #:  4    Treatment Area: Neck pain [M54.2]    SUBJECTIVE  Pain Level (0-10 scale): 3/10  Any medication changes, allergies to medications, adverse drug reactions, diagnosis change, or new procedure performed?: [x] No    [] Yes (see summary sheet for update)  Subjective functional status/changes:   [] No changes reported  Patient reports a significant long term improvement in his neck pain since starting DN.     OBJECTIVE              Modality rationale: decrease pain and increase tissue extensibility to improve the patients ability to turn his head without pain    Min Type Additional Details     [] Estim: []Att   []Unatt        []TENS instruct                  []IFC  []Premod   []NMES                     []Other:  []w/US   []w/ice   []w/heat  Position:  Location:     []  Traction: [] Cervical       []Lumbar                       [] Prone          []Supine                       []Intermittent   []Continuous Lbs:  [] before manual  [] after manual  []w/heat     []  Ultrasound: []Continuous   [] Pulsed at:                            []1MHz   []3MHz Location:  W/cm2:     []  Paraffin         Location:  []w/heat   10 []  Ice     [x]  Heat post-DN  []  Ice massage Position: Seated  Location: L neck     []  Laser  []  Other: Position:  Location:     []  Vasopneumatic Device Pressure:       [] lo [] med [] hi   Temperature:    [x] Skin assessment post-treatment:  [x]intact []redness- no adverse reaction    []redness - adverse reaction:      10    min Dry Needling without E-Stim         (not to be included in total timed codes or 1:1 Treatment Time)      min Dry Needling with E-Stim: Attended      Type:       Muscles:      min Dry Needling with E-Stim: Unattended      Type:       Muscles:          Billing:  [x]  [id number] (1-2 muscles)         []  32857 (3+ muscles)             [x]  Script obtained from MD specifying \"Dry Needling\"     Written Informed Consent Obtained and Document Included in Chart: YES     Procedure: Intramsuclar Dry Needling was done with a .30 mm  gauge solid monofilament needle,                       under aseptic technique       Rationale/Necessity: Decrease pain, Increase ROM, Increase/Improve Function, Increase Tissue Extensibility, Reduce Trigger Points, Reduce Spasm and Restore Muscle Balance     Muscles Treated:  []  Bilateral:                                  []  Right:                                  []  Left: Upper trapezius                           [x]  Hemostasis applied after each needle application      Response: Localized Twitch Response, Increased ROM, Increased Tolerance to Exercise/Activity, Increased Motor Recruitment, Improve Soft Tissue Mobility, Reduced Tightness and Post Needle Soreness     Education: Purpose or rationale of dry needling                      Side effects and possible adverse effects of the treatment                      The need to report the use of blood thinners and/or immunosupressant medications                      How to respond to possible adverse effects of the treatment                       Self-treatment of post needling soreness (ice/heat, stretching, activity modification)                      Opportunity was given to ask questions; all questions were answered       With   [] TE   [] TA   [] Neuro   [] SC   [] other: Patient Education: [x] Review HEP    [] Progressed/Changed HEP based on:   [] positioning   [] body mechanics   [] transfers   [] heat/ice application    [] other:      Other Objective/Functional Measures:      Pain Level (0-10 scale) post treatment: 2    ASSESSMENT/Changes in Function:   Patient will continue to benefit from skilled PT services to modify and progress therapeutic interventions, address functional mobility deficits, address ROM deficits, address strength deficits, analyze and address soft tissue restrictions, analyze and cue movement patterns and analyze and modify body mechanics/ergonomics to attain remaining goals. []  See Plan of Care  []  See progress note/recertification  []  See Discharge Summary         Progress towards goals / Updated goals:  Excellent progress at this time towards long term goals.     PLAN  [x]  Upgrade activities as tolerated     [x]  Continue plan of care  []  Update interventions per flow sheet       []  Discharge due to:_  []  Other:_      Gildardo Kendrick, PT 5/10/2022

## 2022-05-17 ENCOUNTER — HOSPITAL ENCOUNTER (OUTPATIENT)
Dept: PHYSICAL THERAPY | Age: 52
Discharge: HOME | End: 2022-05-17
Payer: COMMERCIAL

## 2022-05-17 PROCEDURE — 20560 NDL INSJ W/O NJX 1 OR 2 MUSC: CPT | Performed by: PHYSICAL THERAPIST

## 2022-05-17 NOTE — PROGRESS NOTES
PT DAILY TREATMENT NOTE 2-15    Patient Name: Jorge Lantigua. Date:2022  : 1970  [x]  Patient  Verified  Payor: BLUE CROSS / Plan: [place] [id number] PPO / Product Type: PPO /    In time: 5:45 PM  Out time: 6:05 PM  Total Treatment Time (min): 20  Visit #:  5    Treatment Area: Neck pain [M54.2]    SUBJECTIVE  Pain Level (0-10 scale): 5/10  Any medication changes, allergies to medications, adverse drug reactions, diagnosis change, or new procedure performed?: [x] No    [] Yes (see summary sheet for update)  Subjective functional status/changes:   [] No changes reported  Patient reports that the neck pain has returned to slightly elevated levels, similar to last month.     OBJECTIVE              Modality rationale: decrease pain and increase tissue extensibility to improve the patients ability to turn his head without pain    Min Type Additional Details     [] Estim: []Att   []Unatt        []TENS instruct                  []IFC  []Premod   []NMES                     []Other:  []w/US   []w/ice   []w/heat  Position:  Location:     []  Traction: [] Cervical       []Lumbar                       [] Prone          []Supine                       []Intermittent   []Continuous Lbs:  [] before manual  [] after manual  []w/heat     []  Ultrasound: []Continuous   [] Pulsed at:                            []1MHz   []3MHz Location:  W/cm2:     []  Paraffin         Location:  []w/heat   10 []  Ice     [x]  Heat post-DN  []  Ice massage Position: Seated  Location: L neck     []  Laser  []  Other: Position:  Location:     []  Vasopneumatic Device Pressure:       [] lo [] med [] hi   Temperature:    [x] Skin assessment post-treatment:  [x]intact []redness- no adverse reaction    []redness - adverse reaction:      10    min Dry Needling without E-Stim         (not to be included in total timed codes or 1:1 Treatment Time)      min Dry Needling with E-Stim: Attended      Type:       Muscles:      min Dry Needling with E-Stim: Unattended      Type:       Muscles:          Billing:  [x]  [id number] (1-2 muscles)         []  [id number] (3+ muscles)             [x]  Script obtained from MD specifying \"Dry Needling\"     Written Informed Consent Obtained and Document Included in Chart: YES     Procedure: Intramsuclar Dry Needling was done with a .30 mm  gauge solid monofilament needle,                       under aseptic technique       Rationale/Necessity: Decrease pain, Increase ROM, Increase/Improve Function, Increase Tissue Extensibility, Reduce Trigger Points, Reduce Spasm and Restore Muscle Balance     Muscles Treated:  []  Bilateral:                                  []  Right:                                  []  Left: Upper trapezius                           [x]  Hemostasis applied after each needle application      Response: Localized Twitch Response, Increased ROM, Increased Tolerance to Exercise/Activity, Increased Motor Recruitment, Improve Soft Tissue Mobility, Reduced Tightness and Post Needle Soreness     Education: Purpose or rationale of dry needling                      Side effects and possible adverse effects of the treatment                      The need to report the use of blood thinners and/or immunosupressant medications                      How to respond to possible adverse effects of the treatment                       Self-treatment of post needling soreness (ice/heat, stretching, activity modification)                      Opportunity was given to ask questions; all questions were answered       With   [] TE   [] TA   [] Neuro   [] SC   [] other: Patient Education: [x] Review HEP    [] Progressed/Changed HEP based on:   [] positioning   [] body mechanics   [] transfers   [] heat/ice application    [] other:      Other Objective/Functional Measures:      Pain Level (0-10 scale) post treatment: 3    ASSESSMENT/Changes in Function:   Patient will continue to benefit from skilled PT services to modify and progress therapeutic interventions, address functional mobility deficits, address ROM deficits, address strength deficits, analyze and address soft tissue restrictions, analyze and cue movement patterns and analyze and modify body mechanics/ergonomics to attain remaining goals. []  See Plan of Care  []  See progress note/recertification  []  See Discharge Summary         Progress towards goals / Updated goals:  No significant improvement seen on today's date.     PLAN  [x]  Upgrade activities as tolerated     [x]  Continue plan of care  []  Update interventions per flow sheet       []  Discharge due to:_  []  Other:_      Electchaitanya Montilla, PT 5/17/2022

## 2022-05-24 ENCOUNTER — HOSPITAL ENCOUNTER (OUTPATIENT)
Dept: PHYSICAL THERAPY | Age: 52
Discharge: HOME | End: 2022-05-24
Payer: COMMERCIAL

## 2022-05-24 PROCEDURE — 20560 NDL INSJ W/O NJX 1 OR 2 MUSC: CPT | Performed by: PHYSICAL THERAPIST

## 2022-05-24 NOTE — PROGRESS NOTES
PT DAILY TREATMENT NOTE 2-15    Patient Name: Yobani Cavazos. Date:2022  : 1970  [x]  Patient  Verified  Payor: BLUE CROSS / Plan: [place] [id number] PPO / Product Type: PPO /    In time: 5:45 PM  Out time: 6:05 PM  Total Treatment Time (min): 20  Visit #:  6    Treatment Area: Neck pain [M54.2]    SUBJECTIVE  Pain Level (0-10 scale): 3/10  Any medication changes, allergies to medications, adverse drug reactions, diagnosis change, or new procedure performed?: [x] No    [] Yes (see summary sheet for update)  Subjective functional status/changes:   [] No changes reported  Patient reports an overall improvement since both his last visit and starting DN last month.     OBJECTIVE              Modality rationale: decrease pain and increase tissue extensibility to improve the patients ability to turn his head without pain    Min Type Additional Details     [] Estim: []Att   []Unatt        []TENS instruct                  []IFC  []Premod   []NMES                     []Other:  []w/US   []w/ice   []w/heat  Position:  Location:     []  Traction: [] Cervical       []Lumbar                       [] Prone          []Supine                       []Intermittent   []Continuous Lbs:  [] before manual  [] after manual  []w/heat     []  Ultrasound: []Continuous   [] Pulsed at:                            []1MHz   []3MHz Location:  W/cm2:     []  Paraffin         Location:  []w/heat   10 []  Ice     [x]  Heat post-DN  []  Ice massage Position: Seated  Location: L neck     []  Laser  []  Other: Position:  Location:     []  Vasopneumatic Device Pressure:       [] lo [] med [] hi   Temperature:    [x] Skin assessment post-treatment:  [x]intact []redness- no adverse reaction    []redness - adverse reaction:      10    min Dry Needling without E-Stim         (not to be included in total timed codes or 1:1 Treatment Time)      min Dry Needling with E-Stim: Attended      Type:       Muscles:      min Dry Needling with E-Stim: Unattended      Type:       Muscles:          Billing:  [x]  [id number] (1-2 muscles)         []  [id number] (3+ muscles)             [x]  Script obtained from MD specifying \"Dry Needling\"     · Written Informed Consent Obtained and Document Included in Chart: YES     Procedure: Intramsuclar Dry Needling was done with a .30 mm  gauge solid monofilament needle,                       under aseptic technique       Rationale/Necessity: Decrease pain, Increase ROM, Increase/Improve Function, Increase Tissue Extensibility, Reduce Trigger Points, Reduce Spasm and Restore Muscle Balance     Muscles Treated:  []  Bilateral:                                  []  Right:                                  []  Left: Upper trapezius                           [x]  Hemostasis applied after each needle application      Response: Localized Twitch Response, Increased ROM, Increased Tolerance to Exercise/Activity, Increased Motor Recruitment, Improve Soft Tissue Mobility, Reduced Tightness and Post Needle Soreness     Education: Purpose or rationale of dry needling                      Side effects and possible adverse effects of the treatment                      The need to report the use of blood thinners and/or immunosupressant medications                      How to respond to possible adverse effects of the treatment                       Self-treatment of post needling soreness (ice/heat, stretching, activity modification)                      Opportunity was given to ask questions; all questions were answered       With   [] TE   [] TA   [] Neuro   [] SC   [] other: Patient Education: [x] Review HEP    [] Progressed/Changed HEP based on:   [] positioning   [] body mechanics   [] transfers   [] heat/ice application    [] other:      Other Objective/Functional Measures:      Pain Level (0-10 scale) post treatment: 1    ASSESSMENT/Changes in Function:   Patient will continue to benefit from skilled PT services to modify and progress therapeutic interventions, address functional mobility deficits, address ROM deficits, address strength deficits, analyze and address soft tissue restrictions, analyze and cue movement patterns and analyze and modify body mechanics/ergonomics to attain remaining goals. []  See Plan of Care  []  See progress note/recertification  []  See Discharge Summary         Progress towards goals / Updated goals:  Greater progress seen this past week. Will continue DN at 1x/week over the next 3 weeks and will reassess long term goals then.     PLAN  [x]  Upgrade activities as tolerated     [x]  Continue plan of care  []  Update interventions per flow sheet       []  Discharge due to:_  []  Other:_      Erasmo Mata, PT 5/24/2022

## 2022-05-31 ENCOUNTER — HOSPITAL ENCOUNTER (OUTPATIENT)
Dept: PHYSICAL THERAPY | Age: 52
Discharge: HOME | End: 2022-05-31
Payer: COMMERCIAL

## 2022-05-31 PROCEDURE — 97014 ELECTRIC STIMULATION THERAPY: CPT | Performed by: PHYSICAL THERAPIST

## 2022-05-31 PROCEDURE — 20560 NDL INSJ W/O NJX 1 OR 2 MUSC: CPT | Performed by: PHYSICAL THERAPIST

## 2022-06-07 ENCOUNTER — APPOINTMENT (OUTPATIENT)
Dept: PHYSICAL THERAPY | Age: 52
End: 2022-06-07
Payer: COMMERCIAL

## 2022-06-14 ENCOUNTER — HOSPITAL ENCOUNTER (OUTPATIENT)
Dept: PHYSICAL THERAPY | Age: 52
Discharge: HOME | End: 2022-06-14
Payer: COMMERCIAL

## 2022-06-14 PROCEDURE — 20560 NDL INSJ W/O NJX 1 OR 2 MUSC: CPT | Performed by: PHYSICAL THERAPIST

## 2022-06-14 PROCEDURE — 97014 ELECTRIC STIMULATION THERAPY: CPT | Performed by: PHYSICAL THERAPIST

## 2022-06-15 NOTE — PROGRESS NOTES
PT DAILY TREATMENT NOTE 2-15    Patient Name: Adela Cowan. Date:6/15/2022  : 1970  [x]  Patient  Verified  Payor: BLUE CROSS / Plan: [place] [id number] PPO / Product Type: PPO /    In time: 5:45 PM  Out time: 6:05 PM  Total Treatment Time (min): 20  Visit #:  8    Treatment Area: Neck pain [M54.2]    SUBJECTIVE  Pain Level (0-10 scale): 3/10  Any medication changes, allergies to medications, adverse drug reactions, diagnosis change, or new procedure performed?: [x] No    [] Yes (see summary sheet for update)  Subjective functional status/changes:   [] No changes reported  Patient reports that the e-stim helped give him longer lasting (~3-4 days) pain relief than DN by itself.     OBJECTIVE        min Dry Needling without E-Stim         (not to be included in total timed codes or 1:1 Treatment Time)      min Dry Needling with E-Stim: Attended      Type:       Muscles:    30  min Dry Needling with E-Stim: Unattended      Type: asymmetrical, biphasic, 300 usec, 2 Hz      Muscles: Upper trapezius      Billing:  [x]  [id number] (1-2 muscles)         []  [id number] (3+ muscles)             [x]  Script obtained from MD specifying \"Dry Needling\"     · Written Informed Consent Obtained and Document Included in Chart: YES     Procedure: Intramsuclar Dry Needling was done with a .30 mm  gauge solid monofilament needle,                       under aseptic technique       Rationale/Necessity: Decrease pain, Increase ROM, Increase/Improve Function, Increase Tissue Extensibility, Reduce Trigger Points, Reduce Spasm and Restore Muscle Balance     Muscles Treated:  []  Bilateral:                                  []  Right:                                  []  Left: Upper trapezius                           [x]  Hemostasis applied after each needle application      Response: Localized Twitch Response, Increased ROM, Increased Tolerance to Exercise/Activity, Increased Motor Recruitment, Improve Soft Tissue Mobility, Reduced Tightness and Post Needle Soreness     Education: Purpose or rationale of dry needling                      Side effects and possible adverse effects of the treatment                      The need to report the use of blood thinners and/or immunosupressant medications                      How to respond to possible adverse effects of the treatment                       Self-treatment of post needling soreness (ice/heat, stretching, activity modification)                      Opportunity was given to ask questions; all questions were answered       With   [] TE   [] TA   [] Neuro   [] SC   [] other: Patient Education: [x] Review HEP    [] Progressed/Changed HEP based on:   [] positioning   [] body mechanics   [] transfers   [] heat/ice application    [] other:      Other Objective/Functional Measures:      Pain Level (0-10 scale) post treatment: 1    ASSESSMENT/Changes in Function:   Patient will continue to benefit from skilled PT services to modify and progress therapeutic interventions, address functional mobility deficits, address ROM deficits, address strength deficits, analyze and address soft tissue restrictions, analyze and cue movement patterns and analyze and modify body mechanics/ergonomics to attain remaining goals. []  See Plan of Care  []  See progress note/recertification  []  See Discharge Summary         Progress towards goals / Updated goals:  Greater longer term goals seen this week. Will continue to progress slowly at 1 visit/week for the rest of the month.     PLAN  [x]  Upgrade activities as tolerated     [x]  Continue plan of care  []  Update interventions per flow sheet       []  Discharge due to:_  []  Other:_      Mercedes Arevalo, PT 6/15/2022

## 2022-06-15 NOTE — PROGRESS NOTES
PT DAILY TREATMENT NOTE 2-15    Patient Name: Shena Llanos.   Date:2022  : 1970  [x]  Patient  Verified  Payor: [employer] / Plan: [place] [id number] PPO / Product Type: PPO /    In time: 5:45 PM  Out time: 6:05 PM  Total Treatment Time (min): 20  Visit #:  7    Treatment Area: Neck pain [M54.2]    SUBJECTIVE  Pain Level (0-10 scale): 3/10  Any medication changes, allergies to medications, adverse drug reactions, diagnosis change, or new procedure performed?: [x] No    [] Yes (see summary sheet for update)  Subjective functional status/changes:   [x] No changes reported    OBJECTIVE          min Dry Needling without E-Stim         (not to be included in total timed codes or 1:1 Treatment Time)      min Dry Needling with E-Stim: Attended      Type:       Muscles:    30  min Dry Needling with E-Stim: Unattended      Type: asymmetrical, biphasic, 300 usec, 2 Hz      Muscles: Upper trapezius         Billing:  [x]  [id number] (1-2 muscles)         []  60312 (3+ muscles)             [x]  Script obtained from MD specifying \"Dry Needling\"     · Written Informed Consent Obtained and Document Included in Chart: YES     Procedure: Intramsuclar Dry Needling was done with a .30 mm  gauge solid monofilament needle,                       under aseptic technique       Rationale/Necessity: Decrease pain, Increase ROM, Increase/Improve Function, Increase Tissue Extensibility, Reduce Trigger Points, Reduce Spasm and Restore Muscle Balance     Muscles Treated:  []  Bilateral:                                  []  Right:                                  []  Left: Upper trapezius                           [x]  Hemostasis applied after each needle application      Response: Localized Twitch Response, Increased ROM, Increased Tolerance to Exercise/Activity, Increased Motor Recruitment, Improve Soft Tissue Mobility, Reduced Tightness and Post Needle Soreness     Education: Purpose or rationale of dry needling Side effects and possible adverse effects of the treatment                      The need to report the use of blood thinners and/or immunosupressant medications                      How to respond to possible adverse effects of the treatment                       Self-treatment of post needling soreness (ice/heat, stretching, activity modification)                      Opportunity was given to ask questions; all questions were answered       With   [] TE   [] TA   [] Neuro   [] SC   [] other: Patient Education: [x] Review HEP    [] Progressed/Changed HEP based on:   [] positioning   [] body mechanics   [] transfers   [] heat/ice application    [] other:      Other Objective/Functional Measures:      Pain Level (0-10 scale) post treatment: 1    ASSESSMENT/Changes in Function:   Patient will continue to benefit from skilled PT services to modify and progress therapeutic interventions, address functional mobility deficits, address ROM deficits, address strength deficits, analyze and address soft tissue restrictions, analyze and cue movement patterns and analyze and modify body mechanics/ergonomics to attain remaining goals. []  See Plan of Care  []  See progress note/recertification  []  See Discharge Summary         Progress towards goals / Updated goals:  No significant progress towards long term goals on today's visit. Will f/u on progress with the addition of e-stim next week.     PLAN  [x]  Upgrade activities as tolerated     [x]  Continue plan of care  []  Update interventions per flow sheet       []  Discharge due to:_  []  Other:_      Sonia Sanchez, PT 6/15/2022

## 2022-06-21 ENCOUNTER — HOSPITAL ENCOUNTER (OUTPATIENT)
Dept: PHYSICAL THERAPY | Age: 52
Discharge: HOME | End: 2022-06-21
Payer: COMMERCIAL

## 2022-06-21 PROCEDURE — 97014 ELECTRIC STIMULATION THERAPY: CPT | Performed by: PHYSICAL THERAPIST

## 2022-06-21 PROCEDURE — 20560 NDL INSJ W/O NJX 1 OR 2 MUSC: CPT | Performed by: PHYSICAL THERAPIST

## 2022-06-22 NOTE — PROGRESS NOTES
PT DAILY TREATMENT NOTE 2-15    Patient Name: Óscar Bar. Date:2022  : 1970  [x]  Patient  Verified  Payor: BLUE CROSS / Plan: [employer] [place] [id number] PPO / Product Type: PPO /    In time: 5:45 PM  Out time: 6:05 PM  Total Treatment Time (min): 20  Visit #:  9    Treatment Area: Neck pain [M54.2]    SUBJECTIVE  Pain Level (0-10 scale): 2/10  Any medication changes, allergies to medications, adverse drug reactions, diagnosis change, or new procedure performed?: [x] No    [] Yes (see summary sheet for update)  Subjective functional status/changes:   [] No changes reported  Patient reports continued improvement with the DN and e-stim and has less overall pain than several months ago.     OBJECTIVE        min Dry Needling without E-Stim         (not to be included in total timed codes or 1:1 Treatment Time)      min Dry Needling with E-Stim: Attended      Type:       Muscles:    30  min Dry Needling with E-Stim: Unattended      Type: asymmetrical, biphasic, 300 usec, 2 Hz      Muscles: Upper trapezius      Billing:  [x]  [id number] (1-2 muscles)         []  [id number] (3+ muscles)             [x]  Script obtained from MD specifying \"Dry Needling\"     · Written Informed Consent Obtained and Document Included in Chart: YES     Procedure: Intramsuclar Dry Needling was done with a .30 mm  gauge solid monofilament needle,                       under aseptic technique       Rationale/Necessity: Decrease pain, Increase ROM, Increase/Improve Function, Increase Tissue Extensibility, Reduce Trigger Points, Reduce Spasm and Restore Muscle Balance     Muscles Treated:  []  Bilateral:                                  []  Right:                                  []  Left: Upper trapezius                           [x]  Hemostasis applied after each needle application      Response: Localized Twitch Response, Increased ROM, Increased Tolerance to Exercise/Activity, Increased Motor Recruitment, Improve Soft Tissue Mobility, Reduced Tightness and Post Needle Soreness     Education: Purpose or rationale of dry needling                      Side effects and possible adverse effects of the treatment                      The need to report the use of blood thinners and/or immunosupressant medications                      How to respond to possible adverse effects of the treatment                       Self-treatment of post needling soreness (ice/heat, stretching, activity modification)                      Opportunity was given to ask questions; all questions were answered       With   [] TE   [] TA   [] Neuro   [] SC   [] other: Patient Education: [x] Review HEP    [] Progressed/Changed HEP based on:   [] positioning   [] body mechanics   [] transfers   [] heat/ice application    [] other:      Other Objective/Functional Measures:      Pain Level (0-10 scale) post treatment: 1    ASSESSMENT/Changes in Function:   Patient will continue to benefit from skilled PT services to modify and progress therapeutic interventions, address functional mobility deficits, address ROM deficits, address strength deficits, analyze and address soft tissue restrictions, analyze and cue movement patterns and analyze and modify body mechanics/ergonomics to attain remaining goals. []  See Plan of Care  []  See progress note/recertification  []  See Discharge Summary         Progress towards goals / Updated goals:  Steady progress towards long term goals at this time.     PLAN  [x]  Upgrade activities as tolerated     [x]  Continue plan of care  []  Update interventions per flow sheet       []  Discharge due to:_  []  Other:_      Maria Fernanda Calderón, PT 6/22/2022

## 2022-07-05 ENCOUNTER — HOSPITAL ENCOUNTER (OUTPATIENT)
Dept: PHYSICAL THERAPY | Age: 52
Discharge: HOME | End: 2022-07-05
Payer: COMMERCIAL

## 2022-07-05 PROCEDURE — 97014 ELECTRIC STIMULATION THERAPY: CPT | Performed by: PHYSICAL THERAPIST

## 2022-07-05 PROCEDURE — 20560 NDL INSJ W/O NJX 1 OR 2 MUSC: CPT | Performed by: PHYSICAL THERAPIST

## 2022-07-05 NOTE — PROGRESS NOTES
PT DAILY TREATMENT NOTE 2-15    Patient Name: Sukhjinder Thomas. Date:2022  : 1970  [x]  Patient  Verified  Payor: BLUE CROSS / Plan: [employer] [place] [address]47 PPO / Product Type: PPO /    In time: 5:45 PM  Out time: 6:05 PM  Total Treatment Time (min): 20  Visit #:  10    Treatment Area: Neck pain [M54.2]    SUBJECTIVE  Pain Level (0-10 scale): 1/10  Any medication changes, allergies to medications, adverse drug reactions, diagnosis change, or new procedure performed?: [x] No    [] Yes (see summary sheet for update)  Subjective functional status/changes:   [] No changes reported  Continued improvement, especially when rotating the head to either side.     OBJECTIVE        min Dry Needling without E-Stim         (not to be included in total timed codes or 1:1 Treatment Time)      min Dry Needling with E-Stim: Attended      Type:       Muscles:    30  min Dry Needling with E-Stim: Unattended      Type: asymmetrical, biphasic, 300 usec, 2 Hz      Muscles: Upper trapezius      Billing:  [x]  [id number] (1-2 muscles)         []  [id number] (3+ muscles)             [x]  Script obtained from MD specifying \"Dry Needling\"     · Written Informed Consent Obtained and Document Included in Chart: YES     Procedure: Intramsuclar Dry Needling was done with a .30 mm  gauge solid monofilament needle,                       under aseptic technique       Rationale/Necessity: Decrease pain, Increase ROM, Increase/Improve Function, Increase Tissue Extensibility, Reduce Trigger Points, Reduce Spasm and Restore Muscle Balance     Muscles Treated:  []  Bilateral:                                  []  Right:                                  []  Left: Upper trapezius                           [x]  Hemostasis applied after each needle application      Response: Localized Twitch Response, Increased ROM, Increased Tolerance to Exercise/Activity, Increased Motor Recruitment, Improve Soft Tissue Mobility, Reduced Tightness and Post Needle Soreness     Education: Purpose or rationale of dry needling                      Side effects and possible adverse effects of the treatment                      The need to report the use of blood thinners and/or immunosupressant medications                      How to respond to possible adverse effects of the treatment                       Self-treatment of post needling soreness (ice/heat, stretching, activity modification)                      Opportunity was given to ask questions; all questions were answered       With   [] TE   [] TA   [] Neuro   [] SC   [] other: Patient Education: [x] Review HEP    [] Progressed/Changed HEP based on:   [] positioning   [] body mechanics   [] transfers   [] heat/ice application    [] other:      Other Objective/Functional Measures: Will assess cervical ROM rotation next visit. Pain Level (0-10 scale) post treatment: 0    ASSESSMENT/Changes in Function:   Patient will continue to benefit from skilled PT services to modify and progress therapeutic interventions, address functional mobility deficits, address ROM deficits, address strength deficits, analyze and address soft tissue restrictions, analyze and cue movement patterns and analyze and modify body mechanics/ergonomics to attain remaining goals. []  See Plan of Care  []  See progress note/recertification  []  See Discharge Summary         Progress towards goals / Updated goals:  Continued improvement in both neck pain and HA's reported throughout the week following DN sessions. Will continue at 1x/week until the patient has achieved all long term goals.     PLAN  [x]  Upgrade activities as tolerated     [x]  Continue plan of care  []  Update interventions per flow sheet       []  Discharge due to:_  []  Other:_      Samuel Kidney, PT 7/5/2022

## 2022-07-12 ENCOUNTER — APPOINTMENT (OUTPATIENT)
Dept: PHYSICAL THERAPY | Age: 52
End: 2022-07-12
Payer: COMMERCIAL

## 2022-07-26 ENCOUNTER — HOSPITAL ENCOUNTER (OUTPATIENT)
Dept: PHYSICAL THERAPY | Age: 52
Discharge: HOME | End: 2022-07-26
Payer: COMMERCIAL

## 2022-07-26 PROCEDURE — 20560 NDL INSJ W/O NJX 1 OR 2 MUSC: CPT | Performed by: PHYSICAL THERAPIST

## 2022-07-26 PROCEDURE — 97014 ELECTRIC STIMULATION THERAPY: CPT | Performed by: PHYSICAL THERAPIST

## 2022-07-26 NOTE — PROGRESS NOTES
PT DAILY TREATMENT NOTE 2-15    Patient Name: Lisa Sanchez. Date:2022  : 1970  [x]  Patient  Verified  Payor: BLUE CROSS / Plan: [place] [id number] PPO / Product Type: PPO /    In time: 5:45 PM  Out time: 6:05 PM  Total Treatment Time (min): 20  Visit #:  11    Treatment Area: Neck pain [M54.2]    SUBJECTIVE  Pain Level (0-10 scale): 1/10  Any medication changes, allergies to medications, adverse drug reactions, diagnosis change, or new procedure performed?: [x] No    [] Yes (see summary sheet for update)  Subjective functional status/changes:   [] No changes reported  Patient reports that the R side of his neck has improved, the L is about the same.     OBJECTIVE        min Dry Needling without E-Stim         (not to be included in total timed codes or 1:1 Treatment Time)      min Dry Needling with E-Stim: Attended      Type:       Muscles:    30  min Dry Needling with E-Stim: Unattended      Type: asymmetrical, biphasic, 300 usec, 2 Hz      Muscles: Upper trapezius      Billing:  [x]  [id number] (1-2 muscles)         []  [id number] (3+ muscles)             [x]  Script obtained from MD specifying \"Dry Needling\"     Written Informed Consent Obtained and Document Included in Chart: YES     Procedure: Intramsuclar Dry Needling was done with a .30 mm  gauge solid monofilament needle,                       under aseptic technique       Rationale/Necessity: Decrease pain, Increase ROM, Increase/Improve Function, Increase Tissue Extensibility, Reduce Trigger Points, Reduce Spasm and Restore Muscle Balance     Muscles Treated:  []  Bilateral:                                  []  Right:                                  []  Left: Upper trapezius                           [x]  Hemostasis applied after each needle application      Response: Localized Twitch Response, Increased ROM, Increased Tolerance to Exercise/Activity, Increased Motor Recruitment, Improve Soft Tissue Mobility, Reduced Tightness and Post Needle Soreness     Education: Purpose or rationale of dry needling                      Side effects and possible adverse effects of the treatment                      The need to report the use of blood thinners and/or immunosupressant medications                      How to respond to possible adverse effects of the treatment                       Self-treatment of post needling soreness (ice/heat, stretching, activity modification)                      Opportunity was given to ask questions; all questions were answered       With   [] TE   [] TA   [] Neuro   [] SC   [] other: Patient Education: [x] Review HEP    [] Progressed/Changed HEP based on:   [] positioning   [] body mechanics   [] transfers   [] heat/ice application    [] other:      Other Objective/Functional Measures:          Pain Level (0-10 scale) post treatment: 0    ASSESSMENT/Changes in Function:   Patient will continue to benefit from skilled PT services to modify and progress therapeutic interventions, address functional mobility deficits, address ROM deficits, address strength deficits, analyze and address soft tissue restrictions, analyze and cue movement patterns and analyze and modify body mechanics/ergonomics to attain remaining goals. []  See Plan of Care  []  See progress note/recertification  []  See Discharge Summary         Progress towards goals / Updated goals:  R side has improved, but slow progress this past week with the L. Will continue at 1x/week.     PLAN  [x]  Upgrade activities as tolerated     [x]  Continue plan of care  []  Update interventions per flow sheet       []  Discharge due to:_  []  Other:_      River Dela Cruz, PT 7/26/2022

## 2022-08-02 ENCOUNTER — HOSPITAL ENCOUNTER (OUTPATIENT)
Dept: PHYSICAL THERAPY | Age: 52
Discharge: HOME | End: 2022-08-02
Payer: COMMERCIAL

## 2022-08-02 PROCEDURE — 97014 ELECTRIC STIMULATION THERAPY: CPT | Performed by: PHYSICAL THERAPIST

## 2022-08-02 PROCEDURE — 20560 NDL INSJ W/O NJX 1 OR 2 MUSC: CPT | Performed by: PHYSICAL THERAPIST

## 2022-08-02 NOTE — PROGRESS NOTES
PT DAILY TREATMENT NOTE 2-15    Patient Name: Karen Duckworth. 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 While he has increased neck pain, it is actually less than he anticipated and he believes DN has helped in the long term control his neck pain.     OBJECTIVE        min Dry Needling without E-Stim         (not to be included in total timed codes or 1:1 Treatment Time)      min Dry Needling with E-Stim: Attended      Type:       Muscles:    20  min Dry Needling with E-Stim: Unattended      Type: asymmetrical, biphasic, 300 usec, 2 Hz      Muscles: Upper trapezius      Billing:  [x]  [id number] (1-2 muscles)         []  [id number] (3+ muscles)             [x]  Script obtained from MD specifying \"Dry Needling\"     Written Informed Consent Obtained and Document Included in Chart: YES     Procedure: Intramsuclar Dry Needling was done with a .30 mm  gauge solid monofilament needle,                       under aseptic technique       Rationale/Necessity: Decrease pain, Increase ROM, Increase/Improve Function, Increase Tissue Extensibility, Reduce Trigger Points, Reduce Spasm and Restore Muscle Balance     Muscles Treated:  []  Bilateral:                                  []  Right:                                  []  Left: Upper trapezius                           [x]  Hemostasis applied after each needle application      Response: Localized Subjective   Nadege Bose CMS/[facility])    • Congestive heart failure (CMS/[facility])    • Cough    • Diverticulitis    • Diverticulosis    • DVT (deep venous thrombosis) (CMS/[facility])    • Fatigue    • Heart attack (CMS/HCC) [facility])    • Seizure disorder (CMS/[facility])    • Twitch Response, Increased ROM, Increased Tolerance to Exercise/Activity, Increased Motor Recruitment, Improve Soft Tissue Mobility, Reduced Tightness and Post Needle Soreness     Education: Purpose or rationale of dry needling                      Side effects and possible adverse effects of the treatment                      The need to report the use of blood thinners and/or immunosupressant medications                      How to respond to possible adverse effects of the treatment                       Self-treatment of post needling soreness (ice/heat, stretching, activity modification)                      Opportunity was given to ask questions; all questions were answered       With   [] TE   [] TA   [] Neuro   [] SC   [] other: Patient Education: [x] Review HEP    [] Progressed/Changed HEP based on:   [] positioning   [] body mechanics   [] transfers   [] heat/ice application    [] other:      Other Objective/Functional Measures:          Pain Level (0-10 scale) post treatment: 0    ASSESSMENT/Changes in Function:   Patient will continue to benefit from skilled PT services to modify and progress therapeutic interventions, address functional mobility deficits, address ROM deficits, address strength deficits, analyze and address soft tissue restrictions, analyze and cue movement patterns and analyze and modify body mechanics/ergonomics to attain remaining goals. []  See Plan of Care  []  See progress note/recertification  []  See Discharge Summary         Progress towards goals / Updated goals:  Excellent progress with tolerance of work tasks with less pain overall since starting DN.     PLAN  [x]  Upgrade activities as tolerated     [x]  Continue plan of care  []  Update interventions per flow sheet       []  Discharge due to:_  []  Other:_      Harini Kidd, PT 8/2/2022 

## 2022-08-09 ENCOUNTER — HOSPITAL ENCOUNTER (OUTPATIENT)
Dept: PHYSICAL THERAPY | Age: 52
Discharge: HOME | End: 2022-08-09
Payer: COMMERCIAL

## 2022-08-09 PROCEDURE — 97014 ELECTRIC STIMULATION THERAPY: CPT | Performed by: PHYSICAL THERAPIST

## 2022-08-09 PROCEDURE — 20560 NDL INSJ W/O NJX 1 OR 2 MUSC: CPT | Performed by: PHYSICAL THERAPIST

## 2022-08-09 NOTE — PROGRESS NOTES
PT DAILY TREATMENT NOTE 2-15    Patient Name: Ilene Saul. Date:2022  : 1970  [x]  Patient  Verified  Payor: BLUE CROSS / Plan: [employer] [place] [id number] PPO / Product Type: PPO /    In time: 5:45 PM  Out time: 6:05 PM  Total Treatment Time (min): 20  Visit #:  13    Treatment Area: Neck pain [M54.2]    SUBJECTIVE  Pain Level (0-10 scale): 5/10  Any medication changes, allergies to medications, adverse drug reactions, diagnosis change, or new procedure performed?: [x] No    [] Yes (see summary sheet for update)  Subjective functional status/changes:   [] No changes reported  Patient reports no significant change since last week.     OBJECTIVE        min Dry Needling without E-Stim         (not to be included in total timed codes or 1:1 Treatment Time)      min Dry Needling with E-Stim: Attended      Type:       Muscles:    20  min Dry Needling with E-Stim: Unattended      Type: asymmetrical, biphasic, 300 usec, 2 Hz      Muscles: Upper trapezius      Billing:  [x]  [id number] (1-2 muscles)         []  [id number] (3+ muscles)             [x]  Script obtained from MD specifying \"Dry Needling\"     Written Informed Consent Obtained and Document Included in Chart: YES     Procedure: Intramsuclar Dry Needling was done with a .30 mm  gauge solid monofilament needle,                       under aseptic technique       Rationale/Necessity: Decrease pain, Increase ROM, Increase/Improve Function, Increase Tissue Extensibility, Reduce Trigger Points, Reduce Spasm and Restore Muscle Balance     Muscles Treated:  []  Bilateral:                                  []  Right:                                  []  Left: Upper trapezius                           [x]  Hemostasis applied after each needle application      Response: Localized Twitch Response, Increased ROM, Increased Tolerance to Exercise/Activity, Increased Motor Recruitment, Improve Soft Tissue Mobility, Reduced Tightness and Post Needle Soreness     Education: Purpose or rationale of dry needling                      Side effects and possible adverse effects of the treatment                      The need to report the use of blood thinners and/or immunosupressant medications                      How to respond to possible adverse effects of the treatment                       Self-treatment of post needling soreness (ice/heat, stretching, activity modification)                      Opportunity was given to ask questions; all questions were answered       With   [] TE   [] TA   [] Neuro   [] SC   [] other: Patient Education: [x] Review HEP    [] Progressed/Changed HEP based on:   [] positioning   [] body mechanics   [] transfers   [] heat/ice application    [] other:      Other Objective/Functional Measures:          Pain Level (0-10 scale) post treatment: 0    ASSESSMENT/Changes in Function:   Patient will continue to benefit from skilled PT services to modify and progress therapeutic interventions, address functional mobility deficits, address ROM deficits, address strength deficits, analyze and address soft tissue restrictions, analyze and cue movement patterns and analyze and modify body mechanics/ergonomics to attain remaining goals. []  See Plan of Care  []  See progress note/recertification  []  See Discharge Summary         Progress towards goals / Updated goals:  No progress this past week with DN. Will assess long term improvements next week and adjust POC accordingly.     PLAN  [x]  Upgrade activities as tolerated     [x]  Continue plan of care  []  Update interventions per flow sheet       []  Discharge due to:_  []  Other:_      Srini Conteh, PT 8/9/2022

## 2022-08-16 ENCOUNTER — HOSPITAL ENCOUNTER (OUTPATIENT)
Dept: PHYSICAL THERAPY | Age: 52
Discharge: HOME | End: 2022-08-16
Payer: COMMERCIAL

## 2022-08-16 PROCEDURE — 20560 NDL INSJ W/O NJX 1 OR 2 MUSC: CPT | Performed by: PHYSICAL THERAPIST

## 2022-08-16 PROCEDURE — 97014 ELECTRIC STIMULATION THERAPY: CPT | Performed by: PHYSICAL THERAPIST

## 2022-08-18 NOTE — PROGRESS NOTES
PT DAILY TREATMENT NOTE 2-15    Patient Name: Zehra Ceja. Date:2022  : 1970  [x]  Patient  Verified  Payor: BLUE CROSS / Plan: [employer] [place] [employer] PPO / Product Type: PPO /    In time: 5:45 PM  Out time: 6:05 PM  Total Treatment Time (min): 20  Visit #:  14    Treatment Area: Neck pain [M54.2]    SUBJECTIVE  Pain Level (0-10 scale): 2/10  Any medication changes, allergies to medications, adverse drug reactions, diagnosis change, or new procedure performed?: [x] No    [] Yes (see summary sheet for update)  Subjective functional status/changes:   [] No changes reported  Patient reports greater improvement this past week at his neck. He was able to be on his hands and knees for several hours at his job and did not experience >2/10 neck pain.     OBJECTIVE        min Dry Needling without E-Stim         (not to be included in total timed codes or 1:1 Treatment Time)      min Dry Needling with E-Stim: Attended      Type:       Muscles:    20  min Dry Needling with E-Stim: Unattended      Type: asymmetrical, biphasic, 300 usec, 2 Hz      Muscles: Upper trapezius      Billing:  [x]  [id number] (1-2 muscles)         []  [id number] (3+ muscles)             [x]  Script obtained from MD specifying \"Dry Needling\"     Written Informed Consent Obtained and Document Included in Chart: YES     Procedure: Intramsuclar Dry Needling was done with a .30 mm  gauge solid monofilament needle,                       under aseptic technique       Rationale/Necessity: Decrease pain, Increase ROM, Increase/Improve Function, Increase Tissue Extensibility, Reduce Trigger Points, Reduce Spasm and Restore Muscle Balance     Muscles Treated:  []  Bilateral:                                  []  Right:                                  []  Left: Upper trapezius                           [x]  Hemostasis applied after each needle application      Response: Localized Twitch Response, Increased ROM, Increased Tolerance to Exercise/Activity, Increased Motor Recruitment, Improve Soft Tissue Mobility, Reduced Tightness and Post Needle Soreness     Education: Purpose or rationale of dry needling                      Side effects and possible adverse effects of the treatment                      The need to report the use of blood thinners and/or immunosupressant medications                      How to respond to possible adverse effects of the treatment                       Self-treatment of post needling soreness (ice/heat, stretching, activity modification)                      Opportunity was given to ask questions; all questions were answered       With   [] TE   [] TA   [] Neuro   [] SC   [] other: Patient Education: [x] Review HEP    [] Progressed/Changed HEP based on:   [] positioning   [] body mechanics   [] transfers   [] heat/ice application    [] other:      Other Objective/Functional Measures:          Pain Level (0-10 scale) post treatment: 0    ASSESSMENT/Changes in Function:   Excellent response this past week to DN with greater tolerance to work tasks. Patient will continue to benefit from skilled PT services to modify and progress therapeutic interventions, address functional mobility deficits, address ROM deficits, address strength deficits, analyze and address soft tissue restrictions, analyze and cue movement patterns and analyze and modify body mechanics/ergonomics to attain remaining goals. []  See Plan of Care  []  See progress note/recertification  []  See Discharge Summary         Progress towards goals / Updated goals:  Excellent improvement overall towards long term goals and eventual discharge to [facility] only.     PLAN  [x]  Upgrade activities as tolerated     [x]  Continue plan of care  []  Update interventions per flow sheet       []  Discharge due to:_  []  Other:_      Shira Cox, PT 8/18/2022

## 2022-08-23 ENCOUNTER — HOSPITAL ENCOUNTER (OUTPATIENT)
Dept: PHYSICAL THERAPY | Age: 52
Discharge: HOME | End: 2022-08-23
Payer: COMMERCIAL

## 2022-08-23 PROCEDURE — 20560 NDL INSJ W/O NJX 1 OR 2 MUSC: CPT | Performed by: PHYSICAL THERAPIST

## 2022-08-23 PROCEDURE — 97014 ELECTRIC STIMULATION THERAPY: CPT | Performed by: PHYSICAL THERAPIST

## 2022-08-23 NOTE — PROGRESS NOTES
PT DAILY TREATMENT NOTE 2-15    Patient Name: Romayne Erps. Date:2022  : 1970  [x]  Patient  Verified  Payor: BLUE CROSS / Plan: [place] [employer] PPO / Product Type: PPO /    In time: 5:45 PM  Out time: 6:05 PM  Total Treatment Time (min): 20  Visit #:  15    Treatment Area: Neck pain [M54.2]    SUBJECTIVE  Pain Level (0-10 scale): 2/10  Any medication changes, allergies to medications, adverse drug reactions, diagnosis change, or new procedure performed?: [x] No    [] Yes (see summary sheet for update)  Subjective functional status/changes:   [] No changes reported  Patient had approximately 6 days of pain relief after the last DN session. \"Whatever we did really worked. I feel like I am turning a corner. \"    OBJECTIVE        min Dry Needling without E-Stim         (not to be included in total timed codes or 1:1 Treatment Time)      min Dry Needling with E-Stim: Attended      Type:       Muscles:    20  min Dry Needling with E-Stim: Unattended      Type: asymmetrical, biphasic, 300 usec, 2 Hz      Muscles: Upper trapezius      Billing:  [x]  [id number] (1-2 muscles)         []  [id number] (3+ muscles)             [x]  Script obtained from MD specifying \"Dry Needling\"     Written Informed Consent Obtained and Document Included in Chart: YES     Procedure: Intramsuclar Dry Needling was done with a .30 mm  gauge solid monofilament needle,                       under aseptic technique       Rationale/Necessity: Decrease pain, Increase ROM, Increase/Improve Function, Increase Tissue Extensibility, Reduce Trigger Points, Reduce Spasm and Restore Muscle Balance     Muscles Treated:  []  Bilateral:                                  []  Right:                                  []  Left: Upper trapezius                           [x]  Hemostasis applied after each needle application      Response: Localized Twitch Response, Increased ROM, Increased Tolerance to Exercise/Activity, Increased Motor Recruitment, Improve Soft Tissue Mobility, Reduced Tightness and Post Needle Soreness     Education: Purpose or rationale of dry needling                      Side effects and possible adverse effects of the treatment                      The need to report the use of blood thinners and/or immunosupressant medications                      How to respond to possible adverse effects of the treatment                       Self-treatment of post needling soreness (ice/heat, stretching, activity modification)                      Opportunity was given to ask questions; all questions were answered       With   [] TE   [] TA   [] Neuro   [] SC   [] other: Patient Education: [x] Review HEP    [] Progressed/Changed HEP based on:   [] positioning   [] body mechanics   [] transfers   [] heat/ice application    [] other:      Other Objective/Functional Measures:          Pain Level (0-10 scale) post treatment: 0    ASSESSMENT/Changes in Function:   Excellent response this past week to DN with greater tolerance to work tasks. Patient will continue to benefit from skilled PT services to modify and progress therapeutic interventions, address functional mobility deficits, address ROM deficits, address strength deficits, analyze and address soft tissue restrictions, analyze and cue movement patterns and analyze and modify body mechanics/ergonomics to attain remaining goals. []  See Plan of Care  []  See progress note/recertification  []  See Discharge Summary         Progress towards goals / Updated goals:  Excellent improvement overall towards long term goals and eventual discharge to [facility] only.     PLAN  [x]  Upgrade activities as tolerated     [x]  Continue plan of care  []  Update interventions per flow sheet       []  Discharge due to:_  []  Other:_      Trupti Scales, PT 8/23/2022

## 2022-08-30 ENCOUNTER — APPOINTMENT (OUTPATIENT)
Dept: PHYSICAL THERAPY | Age: 52
End: 2022-08-30
Payer: COMMERCIAL

## 2022-09-12 ENCOUNTER — HOSPITAL ENCOUNTER (OUTPATIENT)
Dept: PHYSICAL THERAPY | Age: 52
Discharge: HOME | End: 2022-09-12
Payer: COMMERCIAL

## 2022-09-12 PROCEDURE — 97014 ELECTRIC STIMULATION THERAPY: CPT | Performed by: PHYSICAL THERAPIST

## 2022-09-12 PROCEDURE — 20560 NDL INSJ W/O NJX 1 OR 2 MUSC: CPT | Performed by: PHYSICAL THERAPIST

## 2022-09-12 NOTE — PROGRESS NOTES
PT DAILY TREATMENT NOTE 2-15    Patient Name: Nicolas Willis. Date:2022  : 1970  [x]  Patient  Verified  Payor: BLUE CROSS / Plan: [employer] [place] [employer]47 PPO / Product Type: PPO /    In time: 5:45 PM  Out time: 6:05 PM  Total Treatment Time (min): 20  Visit #:  16    Treatment Area: Neck pain [M54.2]    SUBJECTIVE  Pain Level (0-10 scale): 2/10  Any medication changes, allergies to medications, adverse drug reactions, diagnosis change, or new procedure performed?: [x] No    [] Yes (see summary sheet for update)  Subjective functional status/changes:   [] No changes reported  Patient reports continued improvement from DN and had a similar response as his last session.     OBJECTIVE        min Dry Needling without E-Stim         (not to be included in total timed codes or 1:1 Treatment Time)      min Dry Needling with E-Stim: Attended      Type:       Muscles:    20  min Dry Needling with E-Stim: Unattended      Type: asymmetrical, biphasic, 300 usec, 2 Hz      Muscles: Upper trapezius      Billing:  [x]  [id number] (1-2 muscles)         []  [id number] (3+ muscles)             [x]  Script obtained from MD specifying \"Dry Needling\"     Written Informed Consent Obtained and Document Included in Chart: YES     Procedure: Intramsuclar Dry Needling was done with a .30 mm  gauge solid monofilament needle,                       under aseptic technique       Rationale/Necessity: Decrease pain, Increase ROM, Increase/Improve Function, Increase Tissue Extensibility, Reduce Trigger Points, Reduce Spasm and Restore Muscle Balance     Muscles Treated:  []  Bilateral:                                  []  Right:                                  []  Left: Upper trapezius                           [x]  Hemostasis applied after each needle application      Response: Localized Twitch Response, Increased ROM, Increased Tolerance to Exercise/Activity, Increased Motor Recruitment, Improve Soft Tissue Mobility, Reduced Tightness and Post Needle Soreness     Education: Purpose or rationale of dry needling                      Side effects and possible adverse effects of the treatment                      The need to report the use of blood thinners and/or immunosupressant medications                      How to respond to possible adverse effects of the treatment                       Self-treatment of post needling soreness (ice/heat, stretching, activity modification)                      Opportunity was given to ask questions; all questions were answered       With   [] TE   [] TA   [] Neuro   [] SC   [] other: Patient Education: [x] Review HEP    [] Progressed/Changed HEP based on:   [] positioning   [] body mechanics   [] transfers   [] heat/ice application    [] other:      Other Objective/Functional Measures:          Pain Level (0-10 scale) post treatment: 0    ASSESSMENT/Changes in Function:   Excellent response this past week to DN with greater tolerance to work tasks. Patient will continue to benefit from skilled PT services to modify and progress therapeutic interventions, address functional mobility deficits, address ROM deficits, address strength deficits, analyze and address soft tissue restrictions, analyze and cue movement patterns and analyze and modify body mechanics/ergonomics to attain remaining goals. []  See Plan of Care  []  See progress note/recertification  []  See Discharge Summary         Progress towards goals / Updated goals: Will hold on DN for 4 weeks and assess readiness for discharge.     PLAN  [x]  Upgrade activities as tolerated     [x]  Continue plan of care  []  Update interventions per flow sheet       []  Discharge due to:_  []  Other:_      Shira Cox, PT 9/12/2022

## 2022-09-27 ENCOUNTER — APPOINTMENT (OUTPATIENT)
Dept: PHYSICAL THERAPY | Age: 52
End: 2022-09-27
Payer: COMMERCIAL

## 2022-10-04 ENCOUNTER — HOSPITAL ENCOUNTER (OUTPATIENT)
Dept: PHYSICAL THERAPY | Age: 52
Discharge: HOME | End: 2022-10-04
Payer: COMMERCIAL

## 2022-10-04 PROCEDURE — 20560 NDL INSJ W/O NJX 1 OR 2 MUSC: CPT | Performed by: PHYSICAL THERAPIST

## 2022-10-04 PROCEDURE — 97014 ELECTRIC STIMULATION THERAPY: CPT | Performed by: PHYSICAL THERAPIST

## 2022-10-04 NOTE — PROGRESS NOTES
PT DAILY TREATMENT NOTE 2-15    Patient Name: Audrey Dietrich. Date:10/4/2022  : 1970  [x]  Patient  Verified  Payor: BLUE CROSS / Plan: [employer] [place] [id number] PPO / Product Type: PPO /    In time: 5:45 PM  Out time: 6:05 PM  Total Treatment Time (min): 20  Visit #:  17    Treatment Area: Neck pain [M54.2]    SUBJECTIVE  Pain Level (0-10 scale): 2/10  Any medication changes, allergies to medications, adverse drug reactions, diagnosis change, or new procedure performed?: [x] No    [] Yes (see summary sheet for update)  Subjective functional status/changes:   [] No changes reported  Patient returns following a 3 week break. He responded very well to DN the last visit and has had no >2/10 neck pain since then, including sleeping several nights at hotels.     OBJECTIVE        min Dry Needling without E-Stim         (not to be included in total timed codes or 1:1 Treatment Time)      min Dry Needling with E-Stim: Attended      Type:       Muscles:    20  min Dry Needling with E-Stim: Unattended      Type: asymmetrical, biphasic, 300 usec, 2 Hz      Muscles: Upper trapezius      Billing:  [x]  [id number] (1-2 muscles)         []  [id number] (3+ muscles)             [x]  Script obtained from MD specifying \"Dry Needling\"     Written Informed Consent Obtained and Document Included in Chart: YES     Procedure: Intramsuclar Dry Needling was done with a .30 mm  gauge solid monofilament needle,                       under aseptic technique       Rationale/Necessity: Decrease pain, Increase ROM, Increase/Improve Function, Increase Tissue Extensibility, Reduce Trigger Points, Reduce Spasm and Restore Muscle Balance     Muscles Treated:  [x]  Bilateral: Upper trapezius                                 []  Right:                                  []  Left:                            [x]  Hemostasis applied after each needle application      Response: Localized Twitch Response, Increased ROM, Increased Tolerance to Exercise/Activity, Increased Motor Recruitment, Improve Soft Tissue Mobility, Reduced Tightness and Post Needle Soreness     Education: Purpose or rationale of dry needling                      Side effects and possible adverse effects of the treatment                      The need to report the use of blood thinners and/or immunosupressant medications                      How to respond to possible adverse effects of the treatment                       Self-treatment of post needling soreness (ice/heat, stretching, activity modification)                      Opportunity was given to ask questions; all questions were answered       With   [] TE   [] TA   [] Neuro   [] SC   [] other: Patient Education: [x] Review HEP    [] Progressed/Changed HEP based on:   [] positioning   [] body mechanics   [] transfers   [] heat/ice application    [] other:      Other Objective/Functional Measures:          Pain Level (0-10 scale) post treatment: 0    ASSESSMENT/Changes in Function:   Excellent response this past week to DN with greater tolerance to work tasks. Patient will continue to benefit from skilled PT services to modify and progress therapeutic interventions, address functional mobility deficits, address ROM deficits, address strength deficits, analyze and address soft tissue restrictions, analyze and cue movement patterns and analyze and modify body mechanics/ergonomics to attain remaining goals. []  See Plan of Care  []  See progress note/recertification  []  See Discharge Summary         Progress towards goals / Updated goals: Will decreased frequency to 1-2x/month for an additional several visits until the patient feels ready for discharge.     PLAN  [x]  Upgrade activities as tolerated     [x]  Continue plan of care  []  Update interventions per flow sheet       []  Discharge due to:_  []  Other:_      Gavin Shrestha, PT 10/4/2022

## 2022-10-11 ENCOUNTER — HOSPITAL ENCOUNTER (OUTPATIENT)
Dept: PHYSICAL THERAPY | Age: 52
Discharge: HOME | End: 2022-10-11
Payer: COMMERCIAL

## 2022-10-11 PROCEDURE — 20560 NDL INSJ W/O NJX 1 OR 2 MUSC: CPT | Performed by: PHYSICAL THERAPIST

## 2022-10-11 PROCEDURE — 97014 ELECTRIC STIMULATION THERAPY: CPT | Performed by: PHYSICAL THERAPIST

## 2022-10-11 NOTE — PROGRESS NOTES
PT DAILY TREATMENT NOTE 2-15    Patient Name: Ann Lovell. Date:10/11/2022  : 1970  [x]  Patient  Verified  Payor: BLUE CROSS / Plan: [employer] [place] [id number] PPO / Product Type: PPO /    In time: 5:45 PM  Out time: 6:05 PM  Total Treatment Time (min): 20  Visit #:  18    Treatment Area: Neck pain [M54.2]    SUBJECTIVE  Pain Level (0-10 scale): 1/10  Any medication changes, allergies to medications, adverse drug reactions, diagnosis change, or new procedure performed?: [x] No    [] Yes (see summary sheet for update)  Subjective functional status/changes:   [] No changes reported  Patient reports good overall improvement from the dry needling last week and would like to continue at 1x/week due to increased demands at work this month.   OBJECTIVE        min Dry Needling without E-Stim         (not to be included in total timed codes or 1:1 Treatment Time)      min Dry Needling with E-Stim: Attended      Type:       Muscles:    20  min Dry Needling with E-Stim: Unattended      Type: asymmetrical, biphasic, 300 usec, 2 Hz      Muscles: Upper trapezius      Billing:  [x]  [id number] (1-2 muscles)         []  [id number] (3+ muscles)             [x]  Script obtained from MD specifying \"Dry Needling\"     Written Informed Consent Obtained and Document Included in Chart: YES     Procedure: Intramsuclar Dry Needling was done with a .30 mm  gauge solid monofilament needle,                       under aseptic technique       Rationale/Necessity: Decrease pain, Increase ROM, Increase/Improve Function, Increase Tissue Extensibility, Reduce Trigger Points, Reduce Spasm and Restore Muscle Balance     Muscles Treated:  [x]  Bilateral: Upper trapezius                                 []  Right:                                  []  Left:                            [x]  Hemostasis applied after each needle application      Response: Localized Twitch Response, Increased ROM, Increased Tolerance to Exercise/Activity, Increased Motor Recruitment, Improve Soft Tissue Mobility, Reduced Tightness and Post Needle Soreness     Education: Purpose or rationale of dry needling                      Side effects and possible adverse effects of the treatment                      The need to report the use of blood thinners and/or immunosupressant medications                      How to respond to possible adverse effects of the treatment                       Self-treatment of post needling soreness (ice/heat, stretching, activity modification)                      Opportunity was given to ask questions; all questions were answered       With   [] TE   [] TA   [] Neuro   [] SC   [] other: Patient Education: [x] Review HEP    [] Progressed/Changed HEP based on:   [] positioning   [] body mechanics   [] transfers   [] heat/ice application    [] other:      Other Objective/Functional Measures:          Pain Level (0-10 scale) post treatment: 0    ASSESSMENT/Changes in Function:   Patient will continue to benefit from skilled PT services to modify and progress therapeutic interventions, address functional mobility deficits, address ROM deficits, address strength deficits, analyze and address soft tissue restrictions, analyze and cue movement patterns and analyze and modify body mechanics/ergonomics to attain remaining goals. []  See Plan of Care  []  See progress note/recertification  []  See Discharge Summary         Progress towards goals / Updated goals:  Continued steady progress towards functional goals.     PLAN  [x]  Upgrade activities as tolerated     [x]  Continue plan of care  []  Update interventions per flow sheet       []  Discharge due to:_  []  Other:_      Denisse Ballard, PT 10/11/2022

## 2022-10-18 ENCOUNTER — APPOINTMENT (OUTPATIENT)
Dept: PHYSICAL THERAPY | Age: 52
End: 2022-10-18
Payer: COMMERCIAL

## 2022-10-25 ENCOUNTER — HOSPITAL ENCOUNTER (OUTPATIENT)
Dept: PHYSICAL THERAPY | Age: 52
Discharge: HOME | End: 2022-10-25
Payer: COMMERCIAL

## 2022-10-25 PROCEDURE — 20560 NDL INSJ W/O NJX 1 OR 2 MUSC: CPT | Performed by: PHYSICAL THERAPIST

## 2022-10-25 PROCEDURE — 97014 ELECTRIC STIMULATION THERAPY: CPT | Performed by: PHYSICAL THERAPIST

## 2022-10-25 NOTE — PROGRESS NOTES
PT DAILY TREATMENT NOTE 2-15    Patient Name: Audrey Dietrich. Date:10/25/2022  : 1970  [x]  Patient  Verified  Payor: BLUE CROSS / Plan: [employer] [place] [id number] PPO / Product Type: PPO /    In time: 5:45 PM  Out time: 6:05 PM  Total Treatment Time (min): 20  Visit #:  19    Treatment Area: Neck pain [M54.2]    SUBJECTIVE  Pain Level (0-10 scale): 2/10  Any medication changes, allergies to medications, adverse drug reactions, diagnosis change, or new procedure performed?: [x] No    [] Yes (see summary sheet for update)  Subjective functional status/changes:   [] No changes reported  Patient reports some increased soreness along B UT after missing last week's appointment, but not significantly worse.     OBJECTIVE        min Dry Needling without E-Stim         (not to be included in total timed codes or 1:1 Treatment Time)      min Dry Needling with E-Stim: Attended      Type:       Muscles:    20  min Dry Needling with E-Stim: Unattended      Type: asymmetrical, biphasic, 300 usec, 2 Hz      Muscles: Upper trapezius      Billing:  [x]  [id number] (1-2 muscles)         []  [id number] (3+ muscles)             [x]  Script obtained from MD specifying \"Dry Needling\"     Written Informed Consent Obtained and Document Included in Chart: YES     Procedure: Intramsuclar Dry Needling was done with a .30 mm  gauge solid monofilament needle,                       under aseptic technique       Rationale/Necessity: Decrease pain, Increase ROM, Increase/Improve Function, Increase Tissue Extensibility, Reduce Trigger Points, Reduce Spasm and Restore Muscle Balance     Muscles Treated:  [x]  Bilateral: Upper trapezius                                 []  Right:                                  []  Left:                            [x]  Hemostasis applied after each needle application      Response: Localized Twitch Response, Increased ROM, Increased Tolerance to Exercise/Activity, Increased Motor Recruitment, Improve Soft Tissue Mobility, Reduced Tightness and Post Needle Soreness     Education: Purpose or rationale of dry needling                      Side effects and possible adverse effects of the treatment                      The need to report the use of blood thinners and/or immunosupressant medications                      How to respond to possible adverse effects of the treatment                       Self-treatment of post needling soreness (ice/heat, stretching, activity modification)                      Opportunity was given to ask questions; all questions were answered       With   [] TE   [] TA   [] Neuro   [] SC   [] other: Patient Education: [x] Review HEP    [] Progressed/Changed HEP based on:   [] positioning   [] body mechanics   [] transfers   [] heat/ice application    [] other:      Other Objective/Functional Measures:          Pain Level (0-10 scale) post treatment: 0    ASSESSMENT/Changes in Function:   Patient will continue to benefit from skilled PT services to modify and progress therapeutic interventions, address functional mobility deficits, address ROM deficits, address strength deficits, analyze and address soft tissue restrictions, analyze and cue movement patterns and analyze and modify body mechanics/ergonomics to attain remaining goals. []  See Plan of Care  []  See progress note/recertification  []  See Discharge Summary         Progress towards goals / Updated goals:  No progress made towards long term goals on today's visit.     PLAN  [x]  Upgrade activities as tolerated     [x]  Continue plan of care  []  Update interventions per flow sheet       []  Discharge due to:_  []  Other:_      Ron Warner, PT 10/25/2022

## 2022-11-01 ENCOUNTER — APPOINTMENT (OUTPATIENT)
Dept: PHYSICAL THERAPY | Age: 52
End: 2022-11-01
Payer: COMMERCIAL

## 2022-11-08 ENCOUNTER — HOSPITAL ENCOUNTER (OUTPATIENT)
Dept: PHYSICAL THERAPY | Age: 52
Discharge: HOME | End: 2022-11-08
Payer: COMMERCIAL

## 2022-11-08 PROCEDURE — 20560 NDL INSJ W/O NJX 1 OR 2 MUSC: CPT | Performed by: PHYSICAL THERAPIST

## 2022-11-08 PROCEDURE — 97014 ELECTRIC STIMULATION THERAPY: CPT | Performed by: PHYSICAL THERAPIST

## 2022-11-08 NOTE — PROGRESS NOTES
PT DAILY TREATMENT NOTE 2-15    Patient Name: Ann Lovell. Date:2022  : 1970  [x]  Patient  Verified  Payor: BLUE CROSS / Plan: [employer] [place] [id number] PPO / Product Type: PPO /    In time: 5:45 PM  Out time: 6:05 PM  Total Treatment Time (min): 20  Visit #:  20    Treatment Area: Neck pain [M54.2]    SUBJECTIVE  Pain Level (0-10 scale): 2/10  Any medication changes, allergies to medications, adverse drug reactions, diagnosis change, or new procedure performed?: [x] No    [] Yes (see summary sheet for update)  Subjective functional status/changes:   [] No changes reported  Patient reports he had a night last week where he slept wrong and his neck was very stiff the next day. Otherwise, he feels about the same.     OBJECTIVE        min Dry Needling without E-Stim         (not to be included in total timed codes or 1:1 Treatment Time)      min Dry Needling with E-Stim: Attended      Type:       Muscles:    20  min Dry Needling with E-Stim: Unattended      Type: asymmetrical, biphasic, 300 usec, 2 Hz      Muscles: Upper trapezius      Billing:  [x]  [id number] (1-2 muscles)         []  [id number] (3+ muscles)             [x]  Script obtained from MD specifying \"Dry Needling\"     Written Informed Consent Obtained and Document Included in Chart: YES     Procedure: Intramsuclar Dry Needling was done with a .30 mm  gauge solid monofilament needle,                       under aseptic technique       Rationale/Necessity: Decrease pain, Increase ROM, Increase/Improve Function, Increase Tissue Extensibility, Reduce Trigger Points, Reduce Spasm and Restore Muscle Balance     Muscles Treated:  [x]  Bilateral: Upper trapezius                                 []  Right:                                  []  Left:                            [x]  Hemostasis applied after each needle application      Response: Localized Twitch Response, Increased ROM, Increased Tolerance to Exercise/Activity, Increased Motor Recruitment, Improve Soft Tissue Mobility, Reduced Tightness and Post Needle Soreness     Education: Purpose or rationale of dry needling                      Side effects and possible adverse effects of the treatment                      The need to report the use of blood thinners and/or immunosupressant medications                      How to respond to possible adverse effects of the treatment                       Self-treatment of post needling soreness (ice/heat, stretching, activity modification)                      Opportunity was given to ask questions; all questions were answered       With   [] TE   [] TA   [] Neuro   [] SC   [] other: Patient Education: [x] Review HEP    [] Progressed/Changed HEP based on:   [] positioning   [] body mechanics   [] transfers   [] heat/ice application    [] other:      Other Objective/Functional Measures:          Pain Level (0-10 scale) post treatment: 0    ASSESSMENT/Changes in Function:   Patient will continue to benefit from skilled PT services to modify and progress therapeutic interventions, address functional mobility deficits, address ROM deficits, address strength deficits, analyze and address soft tissue restrictions, analyze and cue movement patterns and analyze and modify body mechanics/ergonomics to attain remaining goals. []  See Plan of Care  []  See progress note/recertification  []  See Discharge Summary         Progress towards goals / Updated goals:  No significant progress towards short term goals made on today's date.     PLAN  [x]  Upgrade activities as tolerated     [x]  Continue plan of care  []  Update interventions per flow sheet       []  Discharge due to:_  []  Other:_      Mario Ellis, PT 11/8/2022

## 2022-11-15 ENCOUNTER — HOSPITAL ENCOUNTER (OUTPATIENT)
Dept: PHYSICAL THERAPY | Age: 52
Discharge: HOME | End: 2022-11-15
Payer: COMMERCIAL

## 2022-11-15 PROCEDURE — 97014 ELECTRIC STIMULATION THERAPY: CPT | Performed by: PHYSICAL THERAPIST

## 2022-11-15 PROCEDURE — 20560 NDL INSJ W/O NJX 1 OR 2 MUSC: CPT | Performed by: PHYSICAL THERAPIST

## 2022-11-15 NOTE — PROGRESS NOTES
PT DAILY TREATMENT NOTE 2-15    Patient Name: Joselyn Castellano. Date:11/15/2022  : 1970  [x]  Patient  Verified  Payor: BLUE CROSS / Plan: [place] [id number] PPO / Product Type: PPO /    In time: 5:45 PM  Out time: 6:05 PM  Total Treatment Time (min): 20  Visit #:  21    Treatment Area: Neck pain [M54.2]    SUBJECTIVE  Pain Level (0-10 scale): 2/10  Any medication changes, allergies to medications, adverse drug reactions, diagnosis change, or new procedure performed?: [x] No    [] Yes (see summary sheet for update)  Subjective functional status/changes:   [] No changes reported  Patient reports that the weather has caused an increase in neck pain and he is having difficulty turning his head in either direction.     OBJECTIVE        min Dry Needling without E-Stim         (not to be included in total timed codes or 1:1 Treatment Time)      min Dry Needling with E-Stim: Attended      Type:       Muscles:    20  min Dry Needling with E-Stim: Unattended      Type: asymmetrical, biphasic, 300 usec, 2 Hz      Muscles: Upper trapezius      Billing:  [x]  [id number] (1-2 muscles)         []  [id number] (3+ muscles)             [x]  Script obtained from MD specifying \"Dry Needling\"     Written Informed Consent Obtained and Document Included in Chart: YES     Procedure: Intramsuclar Dry Needling was done with a .30 mm  gauge solid monofilament needle,                       under aseptic technique       Rationale/Necessity: Decrease pain, Increase ROM, Increase/Improve Function, Increase Tissue Extensibility, Reduce Trigger Points, Reduce Spasm and Restore Muscle Balance     Muscles Treated:  [x]  Bilateral: Upper trapezius                                 []  Right:                                  []  Left:                            [x]  Hemostasis applied after each needle application      Response: Localized Twitch Response, Increased ROM, Increased Tolerance to Exercise/Activity, Increased Motor Recruitment, Improve Soft Tissue Mobility, Reduced Tightness and Post Needle Soreness     Education: Purpose or rationale of dry needling                      Side effects and possible adverse effects of the treatment                      The need to report the use of blood thinners and/or immunosupressant medications                      How to respond to possible adverse effects of the treatment                       Self-treatment of post needling soreness (ice/heat, stretching, activity modification)                      Opportunity was given to ask questions; all questions were answered       With   [] TE   [] TA   [] Neuro   [] SC   [] other: Patient Education: [x] Review HEP    [] Progressed/Changed HEP based on:   [] positioning   [] body mechanics   [] transfers   [] heat/ice application    [] other:      Other Objective/Functional Measures:      CROM rotation: R: 45 L: 60    Pain Level (0-10 scale) post treatment: 0    ASSESSMENT/Changes in Function:   Significant reduction in ROM compared to last visit, although DN was tolerated well. Patient will continue to benefit from skilled PT services to modify and progress therapeutic interventions, address functional mobility deficits, address ROM deficits, address strength deficits, analyze and address soft tissue restrictions, analyze and cue movement patterns and analyze and modify body mechanics/ergonomics to attain remaining goals. []  See Plan of Care  []  See progress note/recertification  []  See Discharge Summary         Progress towards goals / Updated goals:  Continued slow overall progress towards functional goals due to chronicity of symptoms.     PLAN  [x]  Upgrade activities as tolerated     [x]  Continue plan of care  []  Update interventions per flow sheet       []  Discharge due to:_  []  Other:_      Kaia Thompson, PT 11/15/2022

## 2022-11-22 ENCOUNTER — HOSPITAL ENCOUNTER (OUTPATIENT)
Dept: PHYSICAL THERAPY | Age: 52
Discharge: HOME | End: 2022-11-22
Payer: COMMERCIAL

## 2022-11-22 PROCEDURE — 97014 ELECTRIC STIMULATION THERAPY: CPT | Performed by: PHYSICAL THERAPIST

## 2022-11-22 PROCEDURE — 20560 NDL INSJ W/O NJX 1 OR 2 MUSC: CPT | Performed by: PHYSICAL THERAPIST

## 2022-11-22 NOTE — PROGRESS NOTES
PT DAILY TREATMENT NOTE 2-15    Patient Name: Aviva Mueller. Date:2022  : 1970  [x]  Patient  Verified  Payor: BLUE CROSS / Plan: [employer] [place] [employer]47 PPO / Product Type: PPO /    In time: 5:45 PM  Out time: 6:05 PM  Total Treatment Time (min): 20  Visit #:  22    Treatment Area: Neck pain [M54.2]    SUBJECTIVE  Pain Level (0-10 scale): 0/10  Any medication changes, allergies to medications, adverse drug reactions, diagnosis change, or new procedure performed?: [x] No    [] Yes (see summary sheet for update)  Subjective functional status/changes:   [] No changes reported  Patient reports no pain over this past week and feels comfortable with discharge in the next 1-2 visits.     OBJECTIVE        min Dry Needling without E-Stim         (not to be included in total timed codes or 1:1 Treatment Time)      min Dry Needling with E-Stim: Attended      Type:       Muscles:    20  min Dry Needling with E-Stim: Unattended      Type: asymmetrical, biphasic, 300 usec, 2 Hz      Muscles: Upper trapezius      Billing:  [x]  [id number] (1-2 muscles)         []  [id number] (3+ muscles)             [x]  Script obtained from MD specifying \"Dry Needling\"     Written Informed Consent Obtained and Document Included in Chart: YES     Procedure: Intramsuclar Dry Needling was done with a .30 mm  gauge solid monofilament needle,                       under aseptic technique       Rationale/Necessity: Decrease pain, Increase ROM, Increase/Improve Function, Increase Tissue Extensibility, Reduce Trigger Points, Reduce Spasm and Restore Muscle Balance     Muscles Treated:  [x]  Bilateral: Upper trapezius                                 []  Right:                                  []  Left:                            [x]  Hemostasis applied after each needle application      Response: Localized Twitch Response, Increased ROM, Increased Tolerance to Exercise/Activity, Increased Motor Recruitment, Improve Soft Tissue Mobility, Reduced Tightness and Post Needle Soreness     Education: Purpose or rationale of dry needling                      Side effects and possible adverse effects of the treatment                      The need to report the use of blood thinners and/or immunosupressant medications                      How to respond to possible adverse effects of the treatment                       Self-treatment of post needling soreness (ice/heat, stretching, activity modification)                      Opportunity was given to ask questions; all questions were answered       With   [] TE   [] TA   [] Neuro   [] SC   [] other: Patient Education: [x] Review HEP    [] Progressed/Changed HEP based on:   [] positioning   [] body mechanics   [] transfers   [] heat/ice application    [] other:      Other Objective/Functional Measures:      CROM rotation: WNL in both directions    Pain Level (0-10 scale) post treatment: 0    ASSESSMENT/Changes in Function:   Patient will continue to benefit from skilled PT services to modify and progress therapeutic interventions, address functional mobility deficits, address ROM deficits, address strength deficits, analyze and address soft tissue restrictions, analyze and cue movement patterns and analyze and modify body mechanics/ergonomics to attain remaining goals. []  See Plan of Care  []  See progress note/recertification  []  See Discharge Summary         Progress towards goals / Updated goals: Will review discharge planning next visit.     PLAN  [x]  Upgrade activities as tolerated     [x]  Continue plan of care  []  Update interventions per flow sheet       []  Discharge due to:_  []  Other:_      Charito García, PT 11/22/2022

## 2022-11-29 ENCOUNTER — HOSPITAL ENCOUNTER (OUTPATIENT)
Dept: PHYSICAL THERAPY | Age: 52
Discharge: HOME | End: 2022-11-29
Payer: COMMERCIAL

## 2022-11-29 PROCEDURE — 20560 NDL INSJ W/O NJX 1 OR 2 MUSC: CPT | Performed by: PHYSICAL THERAPIST

## 2022-11-29 PROCEDURE — 97014 ELECTRIC STIMULATION THERAPY: CPT | Performed by: PHYSICAL THERAPIST

## 2022-11-29 NOTE — PROGRESS NOTES
PT DAILY TREATMENT NOTE 2-15    Patient Name: Estela Velasco. Date:2022  : 1970  [x]  Patient  Verified  Payor: BLUE CROSS / Plan: [place] [id number] PPO / Product Type: PPO /    In time: 5:45 PM  Out time: 6:05 PM  Total Treatment Time (min): 20  Visit #:  23    Treatment Area: Neck pain [M54.2]    SUBJECTIVE  Pain Level (0-10 scale): 0/10  Any medication changes, allergies to medications, adverse drug reactions, diagnosis change, or new procedure performed?: [x] No    [] Yes (see summary sheet for update)  Subjective functional status/changes:   [] No changes reported  Patient feels ready for discharge.     OBJECTIVE        min Dry Needling without E-Stim         (not to be included in total timed codes or 1:1 Treatment Time)      min Dry Needling with E-Stim: Attended      Type:       Muscles:    20  min Dry Needling with E-Stim: Unattended      Type: asymmetrical, biphasic, 300 usec, 2 Hz      Muscles: Upper trapezius      Billing:  [x]  13454 (1-2 muscles)         []  96323 (3+ muscles)             [x]  Script obtained from MD specifying \"Dry Needling\"     Written Informed Consent Obtained and Document Included in Chart: YES     Procedure: Intramsuclar Dry Needling was done with a .30 mm  gauge solid monofilament needle,                       under aseptic technique       Rationale/Necessity: Decrease pain, Increase ROM, Increase/Improve Function, Increase Tissue Extensibility, Reduce Trigger Points, Reduce Spasm and Restore Muscle Balance     Muscles Treated:  [x]  Bilateral: Upper trapezius                                 []  Right:                                  []  Left:                            [x]  Hemostasis applied after each needle application      Response: Localized Twitch Response, Increased ROM, Increased Tolerance to Exercise/Activity, Increased Motor Recruitment, Improve Soft Tissue Mobility, Reduced Tightness and Post Needle Soreness     Education: Purpose or rationale of dry needling                      Side effects and possible adverse effects of the treatment                      The need to report the use of blood thinners and/or immunosupressant medications                      How to respond to possible adverse effects of the treatment                       Self-treatment of post needling soreness (ice/heat, stretching, activity modification)                      Opportunity was given to ask questions; all questions were answered       With   [] TE   [] TA   [] Neuro   [] SC   [] other: Patient Education: [x] Review HEP    [] Progressed/Changed HEP based on:   [] positioning   [] body mechanics   [] transfers   [] heat/ice application    [] other:      Other Objective/Functional Measures:        Pain Level (0-10 scale) post treatment: 0    ASSESSMENT/Changes in Function:   Patient will continue to benefit from skilled PT services to modify and progress therapeutic interventions, address functional mobility deficits, address ROM deficits, address strength deficits, analyze and address soft tissue restrictions, analyze and cue movement patterns and analyze and modify body mechanics/ergonomics to attain remaining goals.      []  See Plan of Care  []  See progress note/recertification  [x]  See Discharge Summary           PLAN  [x]  Upgrade activities as tolerated     [x]  Continue plan of care  []  Update interventions per flow sheet       []  Discharge due to:_  []  Other:_      Imani Faria, PT 11/29/2022

## 2023-03-06 NOTE — PROGRESS NOTES
[address] [place]  Phone: [phone]  Fax: [phone]    Discharge Summary  2-15    Patient name: Kostas Tee. : 1970  Provider#: [id number]  Referral source: Emily Nettles MD      Medical/Treatment Diagnosis: Left shoulder pain [M25.512]     Prior Hospitalization: see medical history     Comorbidities: See Plan of Care  Prior Level of Function:See Plan of Care  Medications: Verified on Patient Summary List    Start of Care: 18      Onset Date:18   Visits from Start of Care: 47     Missed Visits: 0  Reporting Period : 18 to 18      ASSESSMENT/SUMMARY OF CARE: Mr. Mar Tenorio did very well during his final 4 weeks of PT and achieved all advanced long term goals. He demonstrated full ROM, WNL strength, and was able to return to work at full ability with no pain or limitation. Short Term Goals: To be accomplished in 8 treatments:  1. Patient will be able to demonstrate full PROM flexion to allow for improved ability to reach overhead. Met.  2. Patient will be able to demonstrate >45 degrees of ER PROM to allow for improved ability to put on a jacket. Met.  3. Patient will be able to sleep through the night without being woken up by shoulder pain. Met.    Long Term Goals: To be accomplished in 16 treatments:  1. Patient will be able to demonstrate >90 degrees of AROM flexion with <2/10 shoulder pain. Met.  2. Patient will be able to put on a jacket with <2/10 shoulder pain. Met.  3. Patient will be able to carry 20# at his left side for 50 ft. With no pain or limitation. Met. Advanced Long Term Goals:  1. Patient will be able to climb a 25 ft. Ladder with no shoulder pain or limitation. Met.  2. Patient will be able to press 10# up over his head from a supine position (bench press motion) with no pain or limitation. Met.  3. Patient will be able to lift 5# over his head with no pain or limitation. Met.    RECOMMENDATIONS:  [x]Discontinue therapy: [x]Patient has reached or is progressing toward set goals      []Patient is non-compliant or has abdicated      []Due to lack of appreciable progress towards set goals      []Other    Deonte Hayes PT , DPT, OCS, Cert.  DN   8/21/2018 1:39 PM Birth Control Pills Counseling: Birth Control Pill Counseling: I discussed with the patient the potential side effects of OCPs including but not limited to increased risk of stroke, heart attack, thrombophlebitis, deep venous thrombosis, hepatic adenomas, breast changes, GI upset, headaches, and depression.  The patient verbalized understanding of the proper use and possible adverse effects of OCPs. All of the patient's questions and concerns were addressed.